# Patient Record
Sex: FEMALE | Race: BLACK OR AFRICAN AMERICAN | NOT HISPANIC OR LATINO | URBAN - METROPOLITAN AREA
[De-identification: names, ages, dates, MRNs, and addresses within clinical notes are randomized per-mention and may not be internally consistent; named-entity substitution may affect disease eponyms.]

---

## 2017-05-02 ENCOUNTER — EMERGENCY (EMERGENCY)
Facility: HOSPITAL | Age: 47
LOS: 1 days | Discharge: HOME | End: 2017-05-02
Attending: EMERGENCY MEDICINE | Admitting: EMERGENCY MEDICINE
Payer: COMMERCIAL

## 2017-05-02 VITALS
SYSTOLIC BLOOD PRESSURE: 185 MMHG | DIASTOLIC BLOOD PRESSURE: 94 MMHG | TEMPERATURE: 99 F | OXYGEN SATURATION: 98 % | HEART RATE: 72 BPM | RESPIRATION RATE: 18 BRPM
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
## 2017-05-02 DIAGNOSIS — Z79.899 OTHER LONG TERM (CURRENT) DRUG THERAPY: ICD-10-CM
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
## 2017-05-02 DIAGNOSIS — M25.561 PAIN IN RIGHT KNEE: ICD-10-CM
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
## 2017-05-02 DIAGNOSIS — S80.01XA CONTUSION OF RIGHT KNEE, INITIAL ENCOUNTER: ICD-10-CM

## 2017-05-02 PROCEDURE — 73590 X-RAY EXAM OF LOWER LEG: CPT | Mod: 26,RT

## 2017-05-02 PROCEDURE — 99284 EMERGENCY DEPT VISIT MOD MDM: CPT

## 2017-05-02 PROCEDURE — 99284 EMERGENCY DEPT VISIT MOD MDM: CPT | Mod: 25

## 2017-05-02 PROCEDURE — 73562 X-RAY EXAM OF KNEE 3: CPT | Mod: 26,RT

## 2017-05-02 PROCEDURE — 73562 X-RAY EXAM OF KNEE 3: CPT

## 2017-05-02 PROCEDURE — 73590 X-RAY EXAM OF LOWER LEG: CPT

## 2017-05-02 RX ORDER — IBUPROFEN 200 MG
600 TABLET ORAL ONCE
Qty: 0 | Refills: 0 | Status: COMPLETED | OUTPATIENT
Start: 2017-05-02 | End: 2017-05-02

## 2017-05-02 RX ORDER — IBUPROFEN 200 MG
1 TABLET ORAL
Qty: 30 | Refills: 0 | OUTPATIENT
Start: 2017-05-02

## 2017-05-02 RX ADMIN — Medication 600 MILLIGRAM(S): at 20:20

## 2017-05-02 NOTE — ED ADULT TRIAGE NOTE - CHIEF COMPLAINT QUOTE
Patient c/o of R arm pain and R knee pain after mechanical  fall 1 hour on a broken sidewalk.  Denies loc/dizziness at time of fall.  Able to ambulate w steady gait. Denies head trauma.

## 2017-05-02 NOTE — ED PROVIDER NOTE - DIAGNOSTIC INTERPRETATION
knee xray: no fracture or dislocation read by Dr. Castanon  tib/fib xray: no fracture or dislocation read by Dr. Castanon

## 2017-05-02 NOTE — ED PROVIDER NOTE - MEDICAL DECISION MAKING DETAILS
mechanical trip and fall.  xray without fracture.  suspect abrasion/contusion.  rec motrin/nsaids/elevation
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
## 2017-05-02 NOTE — ED PROVIDER NOTE - SKIN, MLM
Skin normal color for race, warm, dry and intact except for small knee abrasion. No evidence of rash.

## 2017-05-02 NOTE — ED PROVIDER NOTE - MUSCULOSKELETAL, MLM
Spine appears normal, range of motion is not limited.  abrasion to right anterior knee. no deformity.  tenderness anterior right knee, posterior calf.  normal rom of all joints.  discomfort with palpation of right upper extremity without visible abrasion/contusion/focal bony tenderness

## 2017-05-02 NOTE — ED ADULT NURSE NOTE - OBJECTIVE STATEMENT
Pt c/o R arm and knee pain after mechanical fall today. Pt denies LOC or head injury. No deformities noted. Pt ambulating steadily.

## 2017-07-20 ENCOUNTER — EMERGENCY (EMERGENCY)
Facility: HOSPITAL | Age: 47
LOS: 1 days | Discharge: HOME | End: 2017-07-20
Attending: EMERGENCY MEDICINE | Admitting: EMERGENCY MEDICINE
Payer: COMMERCIAL

## 2017-07-20 VITALS
OXYGEN SATURATION: 98 % | RESPIRATION RATE: 18 BRPM | DIASTOLIC BLOOD PRESSURE: 106 MMHG | TEMPERATURE: 98 F | HEART RATE: 70 BPM | SYSTOLIC BLOOD PRESSURE: 155 MMHG

## 2017-07-20 VITALS
SYSTOLIC BLOOD PRESSURE: 152 MMHG | RESPIRATION RATE: 18 BRPM | TEMPERATURE: 99 F | HEART RATE: 75 BPM | WEIGHT: 266.76 LBS | OXYGEN SATURATION: 96 % | DIASTOLIC BLOOD PRESSURE: 112 MMHG

## 2017-07-20 DIAGNOSIS — R42 DIZZINESS AND GIDDINESS: ICD-10-CM

## 2017-07-20 DIAGNOSIS — Z79.899 OTHER LONG TERM (CURRENT) DRUG THERAPY: ICD-10-CM

## 2017-07-20 DIAGNOSIS — R26.2 DIFFICULTY IN WALKING, NOT ELSEWHERE CLASSIFIED: ICD-10-CM

## 2017-07-20 DIAGNOSIS — I10 ESSENTIAL (PRIMARY) HYPERTENSION: ICD-10-CM

## 2017-07-20 DIAGNOSIS — Z88.8 ALLERGY STATUS TO OTHER DRUGS, MEDICAMENTS AND BIOLOGICAL SUBSTANCES STATUS: ICD-10-CM

## 2017-07-20 LAB
ALBUMIN SERPL ELPH-MCNC: 4.1 G/DL — SIGNIFICANT CHANGE UP (ref 3.3–5)
ALP SERPL-CCNC: 88 U/L — SIGNIFICANT CHANGE UP (ref 40–120)
ALT FLD-CCNC: 17 U/L — SIGNIFICANT CHANGE UP (ref 10–45)
ANION GAP SERPL CALC-SCNC: 11 MMOL/L — SIGNIFICANT CHANGE UP (ref 5–17)
AST SERPL-CCNC: 18 U/L — SIGNIFICANT CHANGE UP (ref 10–40)
BILIRUB SERPL-MCNC: 0.4 MG/DL — SIGNIFICANT CHANGE UP (ref 0.2–1.2)
BUN SERPL-MCNC: 11 MG/DL — SIGNIFICANT CHANGE UP (ref 7–23)
CALCIUM SERPL-MCNC: 9 MG/DL — SIGNIFICANT CHANGE UP (ref 8.4–10.5)
CHLORIDE SERPL-SCNC: 103 MMOL/L — SIGNIFICANT CHANGE UP (ref 96–108)
CK MB CFR SERPL CALC: <1 NG/ML — SIGNIFICANT CHANGE UP (ref 0–6.7)
CK SERPL-CCNC: 83 U/L — SIGNIFICANT CHANGE UP (ref 25–170)
CO2 SERPL-SCNC: 26 MMOL/L — SIGNIFICANT CHANGE UP (ref 22–31)
CREAT SERPL-MCNC: 1 MG/DL — SIGNIFICANT CHANGE UP (ref 0.5–1.3)
GLUCOSE SERPL-MCNC: 101 MG/DL — HIGH (ref 70–99)
HCT VFR BLD CALC: 37.3 % — SIGNIFICANT CHANGE UP (ref 34.5–45)
HGB BLD-MCNC: 13.2 G/DL — SIGNIFICANT CHANGE UP (ref 11.5–15.5)
MAGNESIUM SERPL-MCNC: 2.1 MG/DL — SIGNIFICANT CHANGE UP (ref 1.6–2.6)
MCHC RBC-ENTMCNC: 30.3 PG — SIGNIFICANT CHANGE UP (ref 27–34)
MCHC RBC-ENTMCNC: 35.4 G/DL — SIGNIFICANT CHANGE UP (ref 32–36)
MCV RBC AUTO: 85.7 FL — SIGNIFICANT CHANGE UP (ref 80–100)
PLATELET # BLD AUTO: 236 K/UL — SIGNIFICANT CHANGE UP (ref 150–400)
POTASSIUM SERPL-MCNC: 3.7 MMOL/L — SIGNIFICANT CHANGE UP (ref 3.5–5.3)
POTASSIUM SERPL-SCNC: 3.7 MMOL/L — SIGNIFICANT CHANGE UP (ref 3.5–5.3)
PROT SERPL-MCNC: 7.8 G/DL — SIGNIFICANT CHANGE UP (ref 6–8.3)
RBC # BLD: 4.35 M/UL — SIGNIFICANT CHANGE UP (ref 3.8–5.2)
RBC # FLD: 13 % — SIGNIFICANT CHANGE UP (ref 10.3–16.9)
SODIUM SERPL-SCNC: 140 MMOL/L — SIGNIFICANT CHANGE UP (ref 135–145)
TROPONIN T SERPL-MCNC: <0.01 NG/ML — SIGNIFICANT CHANGE UP (ref 0–0.01)
WBC # BLD: 7.2 K/UL — SIGNIFICANT CHANGE UP (ref 3.8–10.5)
WBC # FLD AUTO: 7.2 K/UL — SIGNIFICANT CHANGE UP (ref 3.8–10.5)

## 2017-07-20 PROCEDURE — 82550 ASSAY OF CK (CPK): CPT

## 2017-07-20 PROCEDURE — 82553 CREATINE MB FRACTION: CPT

## 2017-07-20 PROCEDURE — 84484 ASSAY OF TROPONIN QUANT: CPT

## 2017-07-20 PROCEDURE — 99285 EMERGENCY DEPT VISIT HI MDM: CPT | Mod: 25

## 2017-07-20 PROCEDURE — 93010 ELECTROCARDIOGRAM REPORT: CPT

## 2017-07-20 PROCEDURE — 70450 CT HEAD/BRAIN W/O DYE: CPT

## 2017-07-20 PROCEDURE — 80053 COMPREHEN METABOLIC PANEL: CPT

## 2017-07-20 PROCEDURE — 83735 ASSAY OF MAGNESIUM: CPT

## 2017-07-20 PROCEDURE — 93005 ELECTROCARDIOGRAM TRACING: CPT

## 2017-07-20 PROCEDURE — 70450 CT HEAD/BRAIN W/O DYE: CPT | Mod: 26

## 2017-07-20 PROCEDURE — 36415 COLL VENOUS BLD VENIPUNCTURE: CPT

## 2017-07-20 PROCEDURE — 96375 TX/PRO/DX INJ NEW DRUG ADDON: CPT | Mod: XU

## 2017-07-20 PROCEDURE — 71275 CT ANGIOGRAPHY CHEST: CPT

## 2017-07-20 PROCEDURE — 71275 CT ANGIOGRAPHY CHEST: CPT | Mod: 26

## 2017-07-20 PROCEDURE — 85027 COMPLETE CBC AUTOMATED: CPT

## 2017-07-20 PROCEDURE — 96374 THER/PROPH/DIAG INJ IV PUSH: CPT | Mod: XU

## 2017-07-20 PROCEDURE — 99284 EMERGENCY DEPT VISIT MOD MDM: CPT | Mod: 25

## 2017-07-20 RX ORDER — METOCLOPRAMIDE HCL 10 MG
10 TABLET ORAL ONCE
Qty: 0 | Refills: 0 | Status: COMPLETED | OUTPATIENT
Start: 2017-07-20 | End: 2017-07-20

## 2017-07-20 RX ORDER — MECLIZINE HCL 12.5 MG
25 TABLET ORAL ONCE
Qty: 0 | Refills: 0 | Status: COMPLETED | OUTPATIENT
Start: 2017-07-20 | End: 2017-07-20

## 2017-07-20 RX ADMIN — Medication 10 MILLIGRAM(S): at 19:10

## 2017-07-20 RX ADMIN — Medication 25 MILLIGRAM(S): at 16:13

## 2017-07-20 NOTE — ED PROVIDER NOTE - OBJECTIVE STATEMENT
Patient is a 46 year old female, PMH uncontrolled HTN (medication non-compliance), presenting with feeling like the room is spinning and persistent intermittent chest pain for one week. Patient is a 46 year old female, PMH uncontrolled HTN (medication non-compliance), presenting with feeling like the room is spinning and persistent intermittent chest pain for one week. One week ago she developed a sharp, aching pain the left pectoral area of her chest, no radiation, occurs at rest and exertion. She experienced a similar pain two years ago and had a negative stress test/outpatient workup. Yesterday she went to urgent care for the pain, had an x-ray which showed cardiomegaly, and was told to follow up with her PMD. Currently she is not short of breath, but gradually she has developed dyspnea on exertion over the past few months- can only walk half a block before having to rest.   Patient came in today because she developed a feeling like the room is spinning; started yesterday and worsened today.  She is able to ambulate but feels unsteady. No recent illness, blurry vision, double vision, headache or tinnitus.   Of note her blood pressure is uncontrolled - does not take her HCTZ every day as instructed. Patient is a 46 year old female, PMH uncontrolled HTN (medication non-compliance), presenting with feeling like the room is spinning and persistent intermittent chest pain for one week. One week ago she developed a sharp, aching pain the left pectoral area of her chest, no radiation, occurs at rest and exertion. She experienced a similar pain two years ago and had a negative stress test/outpatient workup. Yesterday she went to urgent care for the pain, had an x-ray which showed cardiomegaly, and was told to follow up with her PMD. Currently she is not short of breath, but gradually she has developed dyspnea on exertion over the past few months- can only walk half a block before having to rest.   Patient came in today because she developed a feeling like the room is spinning; started yesterday and worsened today.  She is able to ambulate but feels unsteady. No recent illness, blurry vision, double vision, headache, tinnitus, numbness/tingling, weakness.  Of note her blood pressure is uncontrolled - does not take her HCTZ every day as instructed.

## 2017-07-20 NOTE — ED PROVIDER NOTE - MEDICAL DECISION MAKING DETAILS
Patient is a 46 year old female, PMH uncontrolled HTN, presenting with persistent intermittent chest pain and new onset sensation of the room spinning. Patient is a 46 year old female, PMH uncontrolled HTN, presenting with persistent intermittent chest pain and new onset sensation of the room spinning. Initally hyptertensive with DBP >100, but resolved to normal on repeat BP. Lab work normal. Performed CTPE in the setting of progressive dyspnea on exertion and intermittent chest pain-negative for acute PE.  Area of tenderness left supraclavicular region, confirmed as prominent left supraclavicular lymph nodes on chest CT, will need outpatient follow up and likely biopsy. Persistent vertigo after one dose of Meclizine, will give Reglan. In regards to chest pain, negative for PE, EKG normal, troponin negative.  Possibly secondary to episodes of hypertension. Encouraged medication compliance and follow up with PMD. Patient is a 46 year old female, PMH uncontrolled HTN, presenting with persistent intermittent chest pain and new onset sensation of the room spinning. Initially hypertensive with DBP >100, but resolved to normal on repeat BP. Lab work normal. Performed CTPE in the setting of progressive dyspnea on exertion and intermittent chest pain-negative for acute PE.  Area of tenderness left supraclavicular region, confirmed as prominent left supraclavicular lymph nodes on chest CT, will need outpatient follow up and likely biopsy. Persistent vertigo with no relief post meclizine and reglan. Will obtain head CT in the setting of persistent vertigo , will also give Valium. In regards to chest pain, negative for PE, EKG normal, troponin negative.  Possibly secondary to episodes of hypertension. Encouraged medication compliance and follow up with PMD. Patient is a 46 year old female, PMH uncontrolled HTN, presenting with persistent intermittent chest pain and new onset sensation of the room spinning. Initially hypertensive with DBP >100, but resolved to normal on repeat BP. Lab work normal. Performed CTPE in the setting of progressive dyspnea on exertion and intermittent chest pain-negative for acute PE.  Area of tenderness left supraclavicular region, confirmed as prominent left supraclavicular lymph nodes on chest CT, will need outpatient follow up and likely biopsy. Persistent vertigo with no relief post meclizine and reglan. Will obtain head CT in the setting of persistent vertigo , will also give Valium. In regards to chest pain, negative for PE, EKG normal, troponin negative.  Possibly secondary to episodes of hypertension. Encouraged medication compliance and follow up with PMD. ( offered admission but prefers d/c and f/u as outpatient and such stressed )

## 2017-07-20 NOTE — ED PROVIDER NOTE - ATTENDING CONTRIBUTION TO CARE
45yo woman with c/o dizziness as well as CP and swelling at her left shoulder. Pt with CP that occurred this week, no h/o CAD no cough, no fever, no recent travel. PT states concerned for blood clot. PT also with uncontrolled HTN and notes dizziness describing vertigo and intermittent  positional sx's. PT has also noted swelling to the left clavicle region with associated pain. Pt afVSS well appearing, breathing easily with good air movement, neuro intact with no findings of ataxia or cerebellar dsyfunction, has + edema with TTP and 1cm mobile ST mass to the left supraclavicular region. PT labs without acute findings, troponin negative, CTA done to r/o PE and not found to have such, noted to have enlarged lymph node to the supraclavicular region and instructed as to importance for follow up and biopsy. Pt remained dizzy despite treatment in the ED and so HCT ordered in conjunction with valium for sx control. Pt singed out pending these tests.

## 2017-07-20 NOTE — ED PROVIDER NOTE - CARDIAC, MLM
Normal rate, regular rhythm.  Heart sounds S1, S2.  No murmurs, rubs or gallops. Left pectoral area-nontender to palpation, no rash

## 2017-07-20 NOTE — ED ADULT NURSE NOTE - OBJECTIVE STATEMENT
received pt in room 15. A&Ox3, hx htn, did not take prescribed bp med today. pt c.o dizziness x few days. cp, left sided, intermittent, no radiation, no sob x 1 week. pt c.o ha and nausea. pt also c.o lump with pain noted to L clavical.  denies abd pain. no fever or chills. no cough. seen at urgent care, xray completed. pt could not see followup md. ekg completed. awaiting md byers.

## 2017-07-20 NOTE — ED ADULT TRIAGE NOTE - CHIEF COMPLAINT QUOTE
c/o dizziness since yesterday, worse when bending and reports having chest pain yesterday. Pt states had CXR at urgent Care yesterday and was informed to f/u with PCP and cardiologist today.

## 2019-01-29 PROBLEM — I10 ESSENTIAL (PRIMARY) HYPERTENSION: Chronic | Status: ACTIVE | Noted: 2017-07-20

## 2019-02-05 ENCOUNTER — RESULT REVIEW (OUTPATIENT)
Age: 49
End: 2019-02-05

## 2019-02-05 ENCOUNTER — OUTPATIENT (OUTPATIENT)
Dept: OUTPATIENT SERVICES | Facility: HOSPITAL | Age: 49
LOS: 1 days | Discharge: HOME | End: 2019-02-05
Payer: COMMERCIAL

## 2019-02-05 VITALS
TEMPERATURE: 97 F | HEART RATE: 79 BPM | WEIGHT: 263.23 LBS | SYSTOLIC BLOOD PRESSURE: 144 MMHG | OXYGEN SATURATION: 100 % | HEIGHT: 66 IN | RESPIRATION RATE: 16 BRPM | DIASTOLIC BLOOD PRESSURE: 90 MMHG

## 2019-02-05 VITALS
DIASTOLIC BLOOD PRESSURE: 66 MMHG | RESPIRATION RATE: 18 BRPM | SYSTOLIC BLOOD PRESSURE: 143 MMHG | HEART RATE: 77 BPM | OXYGEN SATURATION: 97 %

## 2019-02-05 DIAGNOSIS — Z98.51 TUBAL LIGATION STATUS: Chronic | ICD-10-CM

## 2019-02-05 DIAGNOSIS — Z98.891 HISTORY OF UTERINE SCAR FROM PREVIOUS SURGERY: Chronic | ICD-10-CM

## 2019-02-05 PROCEDURE — 58558 HYSTEROSCOPY BIOPSY: CPT

## 2019-02-05 PROCEDURE — 86850 RBC ANTIBODY SCREEN: CPT

## 2019-02-05 PROCEDURE — 88305 TISSUE EXAM BY PATHOLOGIST: CPT

## 2019-02-05 PROCEDURE — 86901 BLOOD TYPING SEROLOGIC RH(D): CPT

## 2019-02-05 PROCEDURE — 86900 BLOOD TYPING SEROLOGIC ABO: CPT

## 2019-02-05 RX ORDER — HYDROMORPHONE HYDROCHLORIDE 2 MG/ML
0.5 INJECTION INTRAMUSCULAR; INTRAVENOUS; SUBCUTANEOUS
Qty: 0 | Refills: 0 | Status: DISCONTINUED | OUTPATIENT
Start: 2019-02-05 | End: 2019-02-05

## 2019-02-05 RX ORDER — METOCLOPRAMIDE HCL 10 MG
10 TABLET ORAL EVERY 6 HOURS
Qty: 0 | Refills: 0 | Status: DISCONTINUED | OUTPATIENT
Start: 2019-02-05 | End: 2019-02-05

## 2019-02-05 RX ORDER — ONDANSETRON 8 MG/1
4 TABLET, FILM COATED ORAL ONCE
Qty: 0 | Refills: 0 | Status: COMPLETED | OUTPATIENT
Start: 2019-02-05 | End: 2019-02-05

## 2019-02-05 RX ORDER — SODIUM CHLORIDE 9 MG/ML
1000 INJECTION, SOLUTION INTRAVENOUS
Qty: 0 | Refills: 0 | Status: DISCONTINUED | OUTPATIENT
Start: 2019-02-05 | End: 2019-02-05

## 2019-02-05 RX ORDER — ACETAMINOPHEN 500 MG
1000 TABLET ORAL ONCE
Qty: 0 | Refills: 0 | Status: COMPLETED | OUTPATIENT
Start: 2019-02-05 | End: 2019-02-05

## 2019-02-05 RX ORDER — METOPROLOL TARTRATE 50 MG
3 TABLET ORAL ONCE
Qty: 0 | Refills: 0 | Status: COMPLETED | OUTPATIENT
Start: 2019-02-05 | End: 2019-02-05

## 2019-02-05 RX ADMIN — Medication 106 MILLIGRAM(S): at 17:30

## 2019-02-05 RX ADMIN — Medication 1000 MILLIGRAM(S): at 19:13

## 2019-02-05 RX ADMIN — Medication 1000 MILLIGRAM(S): at 18:45

## 2019-02-05 RX ADMIN — HYDROMORPHONE HYDROCHLORIDE 0.5 MILLIGRAM(S): 2 INJECTION INTRAMUSCULAR; INTRAVENOUS; SUBCUTANEOUS at 17:00

## 2019-02-05 RX ADMIN — ONDANSETRON 4 MILLIGRAM(S): 8 TABLET, FILM COATED ORAL at 17:06

## 2019-02-05 RX ADMIN — HYDROMORPHONE HYDROCHLORIDE 0.5 MILLIGRAM(S): 2 INJECTION INTRAMUSCULAR; INTRAVENOUS; SUBCUTANEOUS at 17:30

## 2019-02-05 NOTE — BRIEF OPERATIVE NOTE - POST-OP DX
Cervical polyp  02/05/2019    Active  Trell Faustin  Endometrial polyp  02/05/2019    Active  Trell Faustin

## 2019-02-05 NOTE — BRIEF OPERATIVE NOTE - PROCEDURE
<<-----Click on this checkbox to enter Procedure Hysteroscopic polypectomy of uterus with dilation and curettage  02/05/2019    Active  [email or web address]

## 2019-02-06 LAB — SURGICAL PATHOLOGY STUDY: SIGNIFICANT CHANGE UP

## 2019-03-18 NOTE — ED ADULT NURSE NOTE - PAIN: BODY LOCATION
Detail Level: Zone
Plan: Refrain from using tretinoin away from mouth.
Otc Regimen: Clindamycin, doxycycline, tretinoin
Otc Regimen: Zyrtec 10 mg, hibiclens
R arm

## 2019-04-03 ENCOUNTER — APPOINTMENT (OUTPATIENT)
Dept: PULMONOLOGY | Facility: CLINIC | Age: 49
End: 2019-04-03
Payer: COMMERCIAL

## 2019-04-03 VITALS
HEIGHT: 66 IN | OXYGEN SATURATION: 98 % | HEART RATE: 62 BPM | SYSTOLIC BLOOD PRESSURE: 120 MMHG | WEIGHT: 271 LBS | DIASTOLIC BLOOD PRESSURE: 90 MMHG | BODY MASS INDEX: 43.55 KG/M2 | RESPIRATION RATE: 12 BRPM | TEMPERATURE: 98.2 F

## 2019-04-03 DIAGNOSIS — I10 ESSENTIAL (PRIMARY) HYPERTENSION: ICD-10-CM

## 2019-04-03 DIAGNOSIS — E66.01 MORBID (SEVERE) OBESITY DUE TO EXCESS CALORIES: ICD-10-CM

## 2019-04-03 DIAGNOSIS — F41.9 ANXIETY DISORDER, UNSPECIFIED: ICD-10-CM

## 2019-04-03 PROCEDURE — 99204 OFFICE O/P NEW MOD 45 MIN: CPT

## 2019-04-03 NOTE — END OF VISIT
[>50% of Time Spent on Counseling for ____] : Greater than 50% of the encounter time was spent on counseling for [unfilled] [FreeTextEntry3] : Case reviewed and patient examined with PA, plans as noted.\par Based on history and physical exam, sleep disordered breathing is at least moderately likely.  The patient was advised to have overnight polysomnography, and will be seen in follow up after testing. Also has suboptimal sleep habits, discussed sleep restriction therapy.  Suspect biggest problem is obstructive sleep apnea \par \par The patient is advised that in addition to worsening sleep leading to daytime sleepiness, sleep apnea may be associated with worsening hypertension and may be a risk factor for cardiovascular disease and stroke.

## 2019-04-03 NOTE — REVIEW OF SYSTEMS
[EDS: ESS=____] : daytime somnolence: ESS=[unfilled] [Recent Wt Gain (___ Lbs)] : recent [unfilled] ~Ulb weight gain [A.M. Headache] : headache present upon awakening [Nocturia] : nocturia [Negative] : Psychiatric [Unusual Sleep Behavior] : no unusual sleep behavior

## 2019-04-03 NOTE — PHYSICAL EXAM
[General Appearance - Well Developed] : well developed [Normal Conjunctiva] : the conjunctiva exhibited no abnormalities [Eyelids - No Xanthelasma] : the eyelids demonstrated no xanthelasmas [IV] : IV [Neck Appearance] : the appearance of the neck was normal [Neck Cervical Mass (___cm)] : no neck mass was observed [Jugular Venous Distention Increased] : there was no jugular-venous distention [Thyroid Diffuse Enlargement] : the thyroid was not enlarged [Thyroid Nodule] : there were no palpable thyroid nodules [Heart Rate And Rhythm] : heart rate was normal and rhythm regular [Heart Sounds] : normal S1 and S2 [Heart Sounds Gallop] : no gallops [Murmurs] : no murmurs [Heart Sounds Pericardial Friction Rub] : no pericardial rub [Auscultation Breath Sounds / Voice Sounds] : lungs were clear to auscultation bilaterally [Skin Color & Pigmentation] : normal skin color and pigmentation [Skin Turgor] : normal skin turgor [Deep Tendon Reflexes (DTR)] : deep tendon reflexes were 2+ and symmetric [Sensation] : the sensory exam was normal to light touch and pinprick [No Focal Deficits] : no focal deficits [Oriented To Time, Place, And Person] : oriented to person, place, and time [Impaired Insight] : insight and judgment were intact [Affect] : the affect was normal [FreeTextEntry1] : morbidly obese  [Nail Clubbing] : no clubbing of the fingernails [Cyanosis, Localized] : no localized cyanosis [Petechial Hemorrhages (___cm)] : no petechial hemorrhages [] : no ischemic changes

## 2019-04-03 NOTE — HISTORY OF PRESENT ILLNESS
[FreeTextEntry1] : 49 y/o AA F with PMHx HTN, obesity BMI 44, anxiety, depression, CALI who is refereed by Dr. Rodriguez for initial evaluation of sleep apnea.\par She works for [employer]  and her hours of work are 8-4. She is complaining of snoring for ~ 7 years and maintaining sleep. She wakes up with occasional morning HA  and has gained about 6lb for the past few months. She also has had sleep paralysis about 2 years ago but has not had any experienced lately. \par \par Sleep routine:\par She goes to bed at 9, sleep latency is about one hour, she wakes up 3 times/night, WASO is about 30 min and then she is up at 6AM. She does not take a nap. She smokes Marijuana and is helping her sleep, her last use was in February. Her ESS is 12/24.\par \par She denies cataplexy, RLS, parasomnia, or any other sleep behavioral issues.\par \par

## 2019-04-03 NOTE — ASSESSMENT
[FreeTextEntry1] : 47 y/o F with chronic CALI who is refereed by Dr. Rodriguez for initial evaluation \par \par

## 2019-04-03 NOTE — REASON FOR VISIT
[Initial Evaluation] : an initial evaluation [Sleep Apnea] : sleep apnea [FreeTextEntry2] : referred by Dr. Rodriguez

## 2019-04-11 ENCOUNTER — OUTPATIENT (OUTPATIENT)
Dept: OUTPATIENT SERVICES | Facility: HOSPITAL | Age: 49
LOS: 1 days | End: 2019-04-11
Payer: COMMERCIAL

## 2019-04-11 ENCOUNTER — APPOINTMENT (OUTPATIENT)
Dept: SLEEP CENTER | Facility: HOSPITAL | Age: 49
End: 2019-04-11

## 2019-04-11 DIAGNOSIS — Z98.891 HISTORY OF UTERINE SCAR FROM PREVIOUS SURGERY: Chronic | ICD-10-CM

## 2019-04-11 DIAGNOSIS — G47.33 OBSTRUCTIVE SLEEP APNEA (ADULT) (PEDIATRIC): ICD-10-CM

## 2019-04-11 DIAGNOSIS — Z98.51 TUBAL LIGATION STATUS: Chronic | ICD-10-CM

## 2019-04-11 PROCEDURE — 95810 POLYSOM 6/> YRS 4/> PARAM: CPT | Mod: 26

## 2019-04-11 PROCEDURE — 95810 POLYSOM 6/> YRS 4/> PARAM: CPT

## 2019-05-06 ENCOUNTER — APPOINTMENT (OUTPATIENT)
Dept: PULMONOLOGY | Facility: CLINIC | Age: 49
End: 2019-05-06
Payer: COMMERCIAL

## 2019-05-06 ENCOUNTER — APPOINTMENT (OUTPATIENT)
Dept: PULMONOLOGY | Facility: CLINIC | Age: 49
End: 2019-05-06

## 2019-05-06 VITALS
BODY MASS INDEX: 43.55 KG/M2 | WEIGHT: 271 LBS | HEART RATE: 76 BPM | SYSTOLIC BLOOD PRESSURE: 130 MMHG | HEIGHT: 66 IN | OXYGEN SATURATION: 98 % | TEMPERATURE: 98.9 F | DIASTOLIC BLOOD PRESSURE: 90 MMHG

## 2019-05-06 DIAGNOSIS — G47.30 SLEEP APNEA, UNSPECIFIED: ICD-10-CM

## 2019-05-06 PROCEDURE — 99213 OFFICE O/P EST LOW 20 MIN: CPT

## 2019-05-07 PROBLEM — G47.30 SLEEP APNEA: Status: ACTIVE | Noted: 2019-04-03

## 2019-05-07 NOTE — HISTORY OF PRESENT ILLNESS
[FreeTextEntry1] : 49 y/o F who is here for follow up after her PSG in 4/11/19 which showed AHI using the AASM criteria at 6.6. The AHI using CMS criteria was at 2.3 with minimal oxygen saturation of 89%.\par She is doing well since the last visit and has no new c/o

## 2019-05-07 NOTE — END OF VISIT
[FreeTextEntry3] : Insignificant obstructive sleep apnea on overnight polysomnography; reassured, discussed sleep hygiene

## 2019-05-07 NOTE — PHYSICAL EXAM
[General Appearance - Well Developed] : well developed [Normal Appearance] : normal appearance [Well Groomed] : well groomed [General Appearance - Well Nourished] : well nourished [No Deformities] : no deformities [General Appearance - In No Acute Distress] : no acute distress [Normal Conjunctiva] : the conjunctiva exhibited no abnormalities [Eyelids - No Xanthelasma] : the eyelids demonstrated no xanthelasmas [IV] : IV [Heart Rate And Rhythm] : heart rate and rhythm were normal [Heart Sounds] : normal S1 and S2 [Murmurs] : no murmurs present [Respiration, Rhythm And Depth] : normal respiratory rhythm and effort [Exaggerated Use Of Accessory Muscles For Inspiration] : no accessory muscle use [Auscultation Breath Sounds / Voice Sounds] : lungs were clear to auscultation bilaterally [Abdomen Soft] : soft [Abdomen Tenderness] : non-tender [Abdomen Mass (___ Cm)] : no abdominal mass palpated [Abnormal Walk] : normal gait [Nail Clubbing] : no clubbing of the fingernails [Cyanosis, Localized] : no localized cyanosis [Gait - Sufficient For Exercise Testing] : the gait was sufficient for exercise testing [Skin Color & Pigmentation] : normal skin color and pigmentation [Petechial Hemorrhages (___cm)] : no petechial hemorrhages [] : no rash [Skin Lesions] : no skin lesions [No Venous Stasis] : no venous stasis [Deep Tendon Reflexes (DTR)] : deep tendon reflexes were 2+ and symmetric [No Xanthoma] : no  xanthoma was observed [No Skin Ulcers] : no skin ulcer [Oriented To Time, Place, And Person] : oriented to person, place, and time [Sensation] : the sensory exam was normal to light touch and pinprick [No Focal Deficits] : no focal deficits [Affect] : the affect was normal [Impaired Insight] : insight and judgment were intact [FreeTextEntry1] : large neck

## 2019-05-08 ENCOUNTER — OTHER (OUTPATIENT)
Age: 49
End: 2019-05-08

## 2019-06-01 ENCOUNTER — EMERGENCY (EMERGENCY)
Facility: HOSPITAL | Age: 49
LOS: 1 days | Discharge: HOME | End: 2019-06-01
Attending: EMERGENCY MEDICINE | Admitting: EMERGENCY MEDICINE
Payer: SELF-PAY

## 2019-06-01 VITALS
DIASTOLIC BLOOD PRESSURE: 72 MMHG | RESPIRATION RATE: 18 BRPM | OXYGEN SATURATION: 100 % | SYSTOLIC BLOOD PRESSURE: 118 MMHG | HEART RATE: 73 BPM | HEIGHT: 65 IN | WEIGHT: 270.07 LBS | TEMPERATURE: 98 F

## 2019-06-01 DIAGNOSIS — M54.5 LOW BACK PAIN: ICD-10-CM
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
## 2019-06-01 DIAGNOSIS — Z98.51 TUBAL LIGATION STATUS: Chronic | ICD-10-CM
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
## 2019-06-01 DIAGNOSIS — Z98.891 HISTORY OF UTERINE SCAR FROM PREVIOUS SURGERY: Chronic | ICD-10-CM

## 2019-06-01 DIAGNOSIS — S39.012A STRAIN OF MUSCLE, FASCIA AND TENDON OF LOWER BACK, INITIAL ENCOUNTER: ICD-10-CM
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
## 2019-06-01 PROCEDURE — 96372 THER/PROPH/DIAG INJ SC/IM: CPT

## 2019-06-01 PROCEDURE — 99283 EMERGENCY DEPT VISIT LOW MDM: CPT | Mod: 25

## 2019-06-01 PROCEDURE — 99283 EMERGENCY DEPT VISIT LOW MDM: CPT

## 2019-06-01 RX ORDER — IBUPROFEN 200 MG
1 TABLET ORAL
Qty: 20 | Refills: 0
Start: 2019-06-01

## 2019-06-01 RX ORDER — KETOROLAC TROMETHAMINE 30 MG/ML
30 SYRINGE (ML) INJECTION ONCE
Refills: 0 | Status: DISCONTINUED | OUTPATIENT
Start: 2019-06-01 | End: 2019-06-01

## 2019-06-01 RX ADMIN — Medication 30 MILLIGRAM(S): at 09:40

## 2019-06-01 RX ADMIN — Medication 30 MILLIGRAM(S): at 09:55

## 2019-06-01 NOTE — ED PROVIDER NOTE - NSFOLLOWUPINSTRUCTIONS_ED_ALL_ED_FT
Take ibuprofen as directed for pain.      Follow up with your PMD next week.    Return to ED with worsening symptoms or other concerns.    Low Back Strain  Image   A strain is a stretch or tear in a muscle or the strong cords of tissue that attach muscle to bone (tendons). Strains of the lower back (lumbar spine) are a common cause of low back pain. A strain occurs when muscles or tendons are torn or are stretched beyond their limits. The muscles may become inflamed, resulting in pain and sudden muscle tightening (spasms). A strain can happen suddenly due to an injury (trauma), or it can develop gradually due to overuse.    There are three types of strains:  Grade 1 is a mild strain involving a minor tear of the muscle fibers or tendons. This may cause some pain but no loss of muscle strength.  Grade 2 is a moderate strain involving a partial tear of the muscle fibers or tendons. This causes more severe pain and some loss of muscle strength.  Grade 3 is a severe strain involving a complete tear of the muscle or tendon. This causes severe pain and complete or nearly complete loss of muscle strength.  What are the causes?  This condition may be caused by:  Trauma, such as a fall or a hit to the body.  Twisting or overstretching the back. This may result from doing activities that require a lot of energy, such as lifting heavy objects.  What increases the risk?  The following factors may increase your risk of getting this condition:  Playing contact sports.  Participating in sports or activities that put excessive stress on the back and require a lot of bending and twisting, including:  Lifting weights or heavy objects.  Gymnastics.  Soccer.  Figure skating.  Snowboarding.  Being overweight or obese.  Having poor strength and flexibility.  What are the signs or symptoms?  Symptoms of this condition may include:  Sharp or dull pain in the lower back that does not go away. Pain may extend to the buttocks.  Stiffness.  Limited range of motion.  Inability to stand up straight due to stiffness or pain.  Muscle spasms.  How is this diagnosed?  This condition may be diagnosed based on:  Your symptoms.  Your medical history.  A physical exam.  Your health care provider may push on certain areas of your back to determine the source of your pain.  You may be asked to bend forward, backward, and side to side to assess the severity of your pain and your range of motion.  Imaging tests, such as:  X-rays.  MRI.  How is this treated?  Treatment for this condition may include:  Applying heat and cold to the affected area.  Medicines to help relieve pain and to relax your muscles (muscle relaxants).  NSAIDs to help reduce swelling and discomfort.  Physical therapy.  When your symptoms improve, it is important to gradually return to your normal routine as soon as possible to reduce pain, avoid stiffness, and avoid loss of muscle strength. Generally, symptoms should improve within 6 weeks of treatment. However, recovery time varies.    Follow these instructions at home:  Managing pain, stiffness, and swelling     If directed, apply ice to the injured area during the first 24 hours after your injury.  Put ice in a plastic bag.  Place a towel between your skin and the bag.  Leave the ice on for 20 minutes, 2–3 times a day.  If directed, apply heat to the affected area as often as told by your health care provider. Use the heat source that your health care provider recommends, such as a moist heat pack or a heating pad.  Place a towel between your skin and the heat source.  Leave the heat on for 20–30 minutes.  Remove the heat if your skin turns bright red. This is especially important if you are unable to feel pain, heat, or cold. You may have a greater risk of getting burned.  Activity     Rest and return to your normal activities as told by your health care provider. Ask your health care provider what activities are safe for you.  Avoid activities that take a lot of effort (are strenuous) for as long as told by your health care provider.  Do exercises as told by your health care provider.  General instructions     Image   Take over-the-counter and prescription medicines only as told by your health care provider.  If you have questions or concerns about safety while taking pain medicine, talk with your health care provider.  Do not drive or operate heavy machinery until you know how your pain medicine affects you.  Do not use any tobacco products, such as cigarettes, chewing tobacco, and e-cigarettes. Tobacco can delay bone healing. If you need help quitting, ask your health care provider.  Keep all follow-up visits as told by your health care provider. This is important.  How is this prevented?  Image Image Image Image Image   Warm up and stretch before being active.  Cool down and stretch after being active.  Give your body time to rest between periods of activity.  Avoid:  Being physically inactive for long periods at a time.  Exercising or playing sports when you are tired or in pain.  Use correct form when playing sports and lifting heavy objects.  Use good posture when sitting and standing.  Maintain a healthy weight.  Sleep on a mattress with medium firmness to support your back.  Make sure to use equipment that fits you, including shoes that fit well.  Be safe and responsible while being active to avoid falls.  Do at least 150 minutes of moderate-intensity exercise each week, such as brisk walking or water aerobics. Try a form of exercise that takes stress off your back, such as swimming or stationary cycling.  Maintain physical fitness, including:  Strength.  Flexibility.  Cardiovascular fitness.  Endurance.  Contact a health care provider if:  Your back pain does not improve after 6 weeks of treatment.  Your symptoms get worse.  Get help right away if:  Your back pain is severe.  You are unable to stand or walk.  You develop pain in your legs.  You develop weakness in your buttocks or legs.  You have difficulty controlling when you urinate or when you have a bowel movement.  This information is not intended to replace advice given to you by your health care provider. Make sure you discuss any questions you have with your health care provider.    Document Released: 12/18/2006 Document Revised: 02/12/2018 Document Reviewed: 09/28/2016  [employer] Interactive Patient Education © 2019 [employer]
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
## 2019-06-01 NOTE — ED ADULT NURSE NOTE - NSIMPLEMENTINTERV_GEN_ALL_ED
Implemented All Universal Safety Interventions:  [place] to call system. Call bell, personal items and telephone within reach. Instruct patient to call for assistance. Room bathroom lighting operational. Non-slip footwear when patient is off stretcher. Physically safe environment: no spills, clutter or unnecessary equipment. Stretcher in lowest position, wheels locked, appropriate side rails in place.

## 2019-11-22 VITALS
WEIGHT: 274.92 LBS | RESPIRATION RATE: 18 BRPM | HEART RATE: 75 BPM | HEIGHT: 65 IN | DIASTOLIC BLOOD PRESSURE: 85 MMHG | TEMPERATURE: 97 F | OXYGEN SATURATION: 95 % | SYSTOLIC BLOOD PRESSURE: 133 MMHG

## 2019-11-22 NOTE — ASU PATIENT PROFILE, ADULT - PSH
History of   x 2  History of tubal ligation History of   x 2  History of shoulder surgery  Right Rotator cuff repair  History of tubal ligation

## 2019-11-22 NOTE — ASU PATIENT PROFILE, ADULT - NS PRO AD PATIENT TYPE
- Leeroy Banuelos/Health Care Proxy (HCP) Do Not Resuscitate (DNR)/ - Leeroy Banuelos /Health Care Proxy (HCP)

## 2019-11-25 ENCOUNTER — RESULT REVIEW (OUTPATIENT)
Age: 49
End: 2019-11-25

## 2019-11-25 ENCOUNTER — OUTPATIENT (OUTPATIENT)
Dept: OUTPATIENT SERVICES | Facility: HOSPITAL | Age: 49
LOS: 1 days | Discharge: HOME | End: 2019-11-25
Payer: COMMERCIAL

## 2019-11-25 VITALS
RESPIRATION RATE: 20 BRPM | SYSTOLIC BLOOD PRESSURE: 140 MMHG | HEART RATE: 80 BPM | OXYGEN SATURATION: 96 % | DIASTOLIC BLOOD PRESSURE: 89 MMHG

## 2019-11-25 DIAGNOSIS — Z98.891 HISTORY OF UTERINE SCAR FROM PREVIOUS SURGERY: Chronic | ICD-10-CM

## 2019-11-25 DIAGNOSIS — Z98.51 TUBAL LIGATION STATUS: Chronic | ICD-10-CM

## 2019-11-25 DIAGNOSIS — Z98.890 OTHER SPECIFIED POSTPROCEDURAL STATES: Chronic | ICD-10-CM

## 2019-11-25 PROCEDURE — 88300 SURGICAL PATH GROSS: CPT | Mod: 26

## 2019-11-25 PROCEDURE — 76000 FLUOROSCOPY <1 HR PHYS/QHP: CPT

## 2019-11-25 PROCEDURE — C1769: CPT

## 2019-11-25 PROCEDURE — 28285 REPAIR OF HAMMERTOE: CPT | Mod: T9

## 2019-11-25 PROCEDURE — 88300 SURGICAL PATH GROSS: CPT

## 2019-12-02 LAB — SURGICAL PATHOLOGY STUDY: SIGNIFICANT CHANGE UP

## 2020-03-09 NOTE — ASU PREOP CHECKLIST - SELECT TESTS ORDERED
PT/PTT/CBC/INR/CMP
IMPROVE VTE Individual Risk Assessment   RISK                                                          Points  [  ] Previous VTE                                                3  [  ] Thrombophilia                                             2  [  ] Lower limb paralysis                                    2        (unable to hold up >15 seconds)    [  ] Current Cancer                                             2         (within 6 months)  [  x] Immobilization > 24 hrs                              1  [  ] ICU/CCU stay > 24 hours                            1  [x  ] Age > 60                                                    1  IMPROVE VTE Score _________2  Heparin s/c for DVT prophylaxis

## 2021-01-01 NOTE — ASU PATIENT PROFILE, ADULT - BRADEN SCORE (IF 18 OR LESS ACTIVATE SKIN INJURY RISK INCREASED GUIDELINE), MLM
[facility] / [facility], [place]    OPERATIVE NOTE         Patient Name: Barby Guadalupe (Baby Girl, Emy)   Medical Record Number: [id number]  Date of Operation: 2021   Diagnoses: Congenital Heart block (ICD-9 746.86), severe prematurity, 26 weeks.     Procedure: Insert ventricular lead and VVI generator, by subxiphoid approach (CPT 09063, 27488)  Surgeon: Dr. Lobo Goff  Assistants: Dr. Misha Coello  Anesthesia: General endotracheal by Dr. Peres    EBL: less than 2cc    DESCRIPTION OF PROCEDURE:     The patient was positioned on the operating table in the supine position and after an adequate level of general endotracheal anesthesia had been obtained, Dr. Fong placed a femoral Broviac line (dictated separately).  Then the chest and abdomen were prepped and draped in sterile fashion.  Prophylactic antibiotics had been administered just before the Broviac procedure.  After completion of the appropriate Time-Out procedure, a midline incision was made in the subxiphoid region and a plane created under the sternum.  The pericardium was opened exposing the free mall and acute margin of the heart. A bipolar Medtronic 25cm lead (S# [id number]) with steroid eluding tips was sewn onto the surface of the right ventricle, one on the diaphragmatic surface and the other on the anterior wall.  The lead was tested and found to have good sensing capabilities (R wave >21) and pacing threshold (0.6mV). The midline incision was extended inferiorly and the peritoneum opened.  There was sufficient space for the generator between the diaphragm and the left lobe of the liver.  I could nt expose the posterior diaphragm enough to make a [place]-abdiaziz pocket, so we elected to secure the device anteriorly to the diaphragm. The generator was a [employer]. Gamal Microny II SR+ ([id number]). The lead was connected and the pacer placed in the abdomen and then secured on the leftward side with a 5-0 prolene suture  through the provided hole in the generator.  A small piece of the [place]-abdiaziz membrane was used to loop around the lead as it exited the generator and secure that to the diapraghm anteriorly to prevent the device from falling posteriorly behind the liver.  The generator was positioned so that the lead exited anteriorly and to the right.  The excess length of pacing lead was placed partly in the pericardium and partly over the anterior surface of the liver. The pericardium and peritoneum were left open, and no drain was left in place to reduce the risk of infection of hte pacing system.  The midline fascia up to the xiphoid was closed with a running 3-0 Vicryl suture.  The subcutaneous tissue was closed with a running 4-0 Vicryl, and her very thin skin was closed with a running 7-0 prolene suture.  The wound was dressed and the patient taken back to the NICU in satisfactory condition having tolerated the procedure and the anesthesia well.  I was present and performed the entire procedure, and was assisted by Dr. Flood as no qualified resident was available to participate in the case. All sponge instrument and needle counts were correct at the completion of the procedure..           FINAL DIAGNOSIS:  Congenital Heart Block, Severe prematurity         23

## 2021-01-09 ENCOUNTER — EMERGENCY (EMERGENCY)
Facility: HOSPITAL | Age: 51
LOS: 1 days | Discharge: HOME | End: 2021-01-09
Attending: EMERGENCY MEDICINE | Admitting: EMERGENCY MEDICINE
Payer: COMMERCIAL

## 2021-01-09 VITALS
OXYGEN SATURATION: 99 % | DIASTOLIC BLOOD PRESSURE: 90 MMHG | SYSTOLIC BLOOD PRESSURE: 152 MMHG | HEART RATE: 79 BPM | WEIGHT: 274.92 LBS | TEMPERATURE: 98 F | RESPIRATION RATE: 18 BRPM | HEIGHT: 65 IN

## 2021-01-09 VITALS
TEMPERATURE: 98 F | DIASTOLIC BLOOD PRESSURE: 86 MMHG | SYSTOLIC BLOOD PRESSURE: 146 MMHG | RESPIRATION RATE: 18 BRPM | HEART RATE: 68 BPM | OXYGEN SATURATION: 98 %

## 2021-01-09 DIAGNOSIS — R07.89 OTHER CHEST PAIN: ICD-10-CM

## 2021-01-09 DIAGNOSIS — Z98.891 HISTORY OF UTERINE SCAR FROM PREVIOUS SURGERY: Chronic | ICD-10-CM

## 2021-01-09 DIAGNOSIS — Z98.890 OTHER SPECIFIED POSTPROCEDURAL STATES: Chronic | ICD-10-CM

## 2021-01-09 DIAGNOSIS — Z98.51 TUBAL LIGATION STATUS: Chronic | ICD-10-CM

## 2021-01-09 DIAGNOSIS — Z20.822 CONTACT WITH AND (SUSPECTED) EXPOSURE TO COVID-19: ICD-10-CM

## 2021-01-09 DIAGNOSIS — R06.02 SHORTNESS OF BREATH: ICD-10-CM

## 2021-01-09 PROBLEM — F32.9 MAJOR DEPRESSIVE DISORDER, SINGLE EPISODE, UNSPECIFIED: Chronic | Status: ACTIVE | Noted: 2019-11-22

## 2021-01-09 LAB
ALBUMIN SERPL ELPH-MCNC: 4.3 G/DL — SIGNIFICANT CHANGE UP (ref 3.3–5)
ALP SERPL-CCNC: 129 U/L — HIGH (ref 40–120)
ALT FLD-CCNC: 23 U/L — SIGNIFICANT CHANGE UP (ref 10–45)
ANION GAP SERPL CALC-SCNC: 14 MMOL/L — SIGNIFICANT CHANGE UP (ref 5–17)
APTT BLD: 33.8 SEC — SIGNIFICANT CHANGE UP (ref 27.5–35.5)
AST SERPL-CCNC: 23 U/L — SIGNIFICANT CHANGE UP (ref 10–40)
BASOPHILS # BLD AUTO: 0.02 K/UL — SIGNIFICANT CHANGE UP (ref 0–0.2)
BASOPHILS NFR BLD AUTO: 0.3 % — SIGNIFICANT CHANGE UP (ref 0–2)
BILIRUB SERPL-MCNC: 0.4 MG/DL — SIGNIFICANT CHANGE UP (ref 0.2–1.2)
BUN SERPL-MCNC: 18 MG/DL — SIGNIFICANT CHANGE UP (ref 7–23)
CALCIUM SERPL-MCNC: 9 MG/DL — SIGNIFICANT CHANGE UP (ref 8.4–10.5)
CHLORIDE SERPL-SCNC: 104 MMOL/L — SIGNIFICANT CHANGE UP (ref 96–108)
CO2 SERPL-SCNC: 22 MMOL/L — SIGNIFICANT CHANGE UP (ref 22–31)
CREAT SERPL-MCNC: 1.02 MG/DL — SIGNIFICANT CHANGE UP (ref 0.5–1.3)
EOSINOPHIL # BLD AUTO: 0.08 K/UL — SIGNIFICANT CHANGE UP (ref 0–0.5)
EOSINOPHIL NFR BLD AUTO: 1 % — SIGNIFICANT CHANGE UP (ref 0–6)
GLUCOSE SERPL-MCNC: 104 MG/DL — HIGH (ref 70–99)
HCT VFR BLD CALC: 40.8 % — SIGNIFICANT CHANGE UP (ref 34.5–45)
HGB BLD-MCNC: 13.6 G/DL — SIGNIFICANT CHANGE UP (ref 11.5–15.5)
IMM GRANULOCYTES NFR BLD AUTO: 0.3 % — SIGNIFICANT CHANGE UP (ref 0–1.5)
INR BLD: 0.94 — SIGNIFICANT CHANGE UP (ref 0.88–1.16)
LYMPHOCYTES # BLD AUTO: 1.72 K/UL — SIGNIFICANT CHANGE UP (ref 1–3.3)
LYMPHOCYTES # BLD AUTO: 22.1 % — SIGNIFICANT CHANGE UP (ref 13–44)
MCHC RBC-ENTMCNC: 29.3 PG — SIGNIFICANT CHANGE UP (ref 27–34)
MCHC RBC-ENTMCNC: 33.3 GM/DL — SIGNIFICANT CHANGE UP (ref 32–36)
MCV RBC AUTO: 87.9 FL — SIGNIFICANT CHANGE UP (ref 80–100)
MONOCYTES # BLD AUTO: 0.52 K/UL — SIGNIFICANT CHANGE UP (ref 0–0.9)
MONOCYTES NFR BLD AUTO: 6.7 % — SIGNIFICANT CHANGE UP (ref 2–14)
NEUTROPHILS # BLD AUTO: 5.42 K/UL — SIGNIFICANT CHANGE UP (ref 1.8–7.4)
NEUTROPHILS NFR BLD AUTO: 69.6 % — SIGNIFICANT CHANGE UP (ref 43–77)
NRBC # BLD: 0 /100 WBCS — SIGNIFICANT CHANGE UP (ref 0–0)
PLATELET # BLD AUTO: 259 K/UL — SIGNIFICANT CHANGE UP (ref 150–400)
POTASSIUM SERPL-MCNC: 4.1 MMOL/L — SIGNIFICANT CHANGE UP (ref 3.5–5.3)
POTASSIUM SERPL-SCNC: 4.1 MMOL/L — SIGNIFICANT CHANGE UP (ref 3.5–5.3)
PROT SERPL-MCNC: 8.1 G/DL — SIGNIFICANT CHANGE UP (ref 6–8.3)
PROTHROM AB SERPL-ACNC: 11.3 SEC — SIGNIFICANT CHANGE UP (ref 10.6–13.6)
RBC # BLD: 4.64 M/UL — SIGNIFICANT CHANGE UP (ref 3.8–5.2)
RBC # FLD: 13 % — SIGNIFICANT CHANGE UP (ref 10.3–14.5)
SARS-COV-2 RNA SPEC QL NAA+PROBE: SIGNIFICANT CHANGE UP
SODIUM SERPL-SCNC: 140 MMOL/L — SIGNIFICANT CHANGE UP (ref 135–145)
TROPONIN T SERPL-MCNC: <0.01 NG/ML — SIGNIFICANT CHANGE UP (ref 0–0.01)
WBC # BLD: 7.78 K/UL — SIGNIFICANT CHANGE UP (ref 3.8–10.5)
WBC # FLD AUTO: 7.78 K/UL — SIGNIFICANT CHANGE UP (ref 3.8–10.5)

## 2021-01-09 PROCEDURE — 71045 X-RAY EXAM CHEST 1 VIEW: CPT | Mod: 26

## 2021-01-09 PROCEDURE — 71275 CT ANGIOGRAPHY CHEST: CPT

## 2021-01-09 PROCEDURE — 84484 ASSAY OF TROPONIN QUANT: CPT

## 2021-01-09 PROCEDURE — 99285 EMERGENCY DEPT VISIT HI MDM: CPT | Mod: 25

## 2021-01-09 PROCEDURE — 36415 COLL VENOUS BLD VENIPUNCTURE: CPT

## 2021-01-09 PROCEDURE — 85379 FIBRIN DEGRADATION QUANT: CPT

## 2021-01-09 PROCEDURE — U0005: CPT

## 2021-01-09 PROCEDURE — 80053 COMPREHEN METABOLIC PANEL: CPT

## 2021-01-09 PROCEDURE — 99284 EMERGENCY DEPT VISIT MOD MDM: CPT | Mod: 25

## 2021-01-09 PROCEDURE — 96374 THER/PROPH/DIAG INJ IV PUSH: CPT | Mod: XU

## 2021-01-09 PROCEDURE — U0003: CPT

## 2021-01-09 PROCEDURE — 85610 PROTHROMBIN TIME: CPT

## 2021-01-09 PROCEDURE — 85730 THROMBOPLASTIN TIME PARTIAL: CPT

## 2021-01-09 PROCEDURE — 85025 COMPLETE CBC W/AUTO DIFF WBC: CPT

## 2021-01-09 PROCEDURE — 71045 X-RAY EXAM CHEST 1 VIEW: CPT

## 2021-01-09 PROCEDURE — 71275 CT ANGIOGRAPHY CHEST: CPT | Mod: 26

## 2021-01-09 RX ORDER — ASPIRIN/CALCIUM CARB/MAGNESIUM 324 MG
325 TABLET ORAL ONCE
Refills: 0 | Status: COMPLETED | OUTPATIENT
Start: 2021-01-09 | End: 2021-01-09

## 2021-01-09 RX ORDER — KETOROLAC TROMETHAMINE 30 MG/ML
30 SYRINGE (ML) INJECTION ONCE
Refills: 0 | Status: DISCONTINUED | OUTPATIENT
Start: 2021-01-09 | End: 2021-01-09

## 2021-01-09 RX ADMIN — Medication 30 MILLIGRAM(S): at 13:33

## 2021-01-09 RX ADMIN — Medication 325 MILLIGRAM(S): at 11:18

## 2021-01-09 NOTE — ED ADULT NURSE NOTE - CAS TRG GENERAL NORM CIRC DET
12/21/2018      RE: Danielle Cook  [address]  [facility] [address]       Nephrology Clinic    ASSESSMENT AND RECOMMENDATIONS:     1.CKD: creatinine baseline 1.3-1.4 mg/dl since 2013 with eGFR 60s. Etiology is likely renovascular disease due to longstanding HTN.  Today , creat 1.2 which is higher than last 6 months but certainly in his range.  2. HTN: BP goal is 140/90 OR LESS.Now at goal.      Follow up in 4 months  Because of recent trend of increased creat (may be part of his variability).      REASON FOR VISIT: CKD follow up     HISTORY OF PRESENT ILLNESS:    Danielle Cook is a 70 year old male who presents for CKD follow up. Since last seen , was hospitalized in October for GI bleed caused by varices. Also had a single paracentesis. Now feels well and has no complaints. Denies weakness, edema, loss of appetite, dizziness, dyspnea.    Seen with .    PAST MEDICAL HISTORY:  Past Medical History:   Diagnosis Date     CKD (chronic kidney disease)      CKD (chronic kidney disease) stage 3, GFR 30-59 ml/min (H)      Hep C w/o coma, chronic (H)      Hepatitis C virus infection      Hypertension      PAST SURGICAL HISTORY:  Past Surgical History:   Procedure Laterality Date     COLONOSCOPY N/A 1/19/2018    Procedure: COLONOSCOPY;  COMBINED ESOPHAGOSCOPY, GASTROSCOPY, DUODENOSCOPY (EGD) REMOVE TUMOR/POYP/LESION BY SNARE, CONTROL BLEED,BANDING/LIGATION OF VARICES Colonoscopy;  Surgeon: Xavier Sutton MD;  Location:  GI     ESOPHAGOSCOPY, GASTROSCOPY, DUODENOSCOPY (EGD), COMBINED N/A 11/4/2015    Procedure: COMBINED ESOPHAGOSCOPY, GASTROSCOPY, DUODENOSCOPY (EGD), BIOPSY SINGLE OR MULTIPLE;  Surgeon: Inocente Do MD;  Location:  GI     ESOPHAGOSCOPY, GASTROSCOPY, DUODENOSCOPY (EGD), COMBINED N/A 3/29/2018    Procedure: COMBINED ESOPHAGOSCOPY, GASTROSCOPY, DUODENOSCOPY (EGD);  egd;  Surgeon: Danielle Mendenhall MD;  Location:  GI     ESOPHAGOSCOPY, GASTROSCOPY,  DUODENOSCOPY (EGD), COMBINED N/A 9/10/2018    Procedure: COMBINED ESOPHAGOSCOPY, GASTROSCOPY, DUODENOSCOPY (EGD);  egd;  Surgeon: Xavier Sutton MD;  Location:  OR     MEDICATIONS:  Prescription Medications as of 12/21/2018       Rx Number Disp Refills Start End Last Dispensed Date Next Fill Date Owning Pharmacy    acetaminophen (TYLENOL) 325 MG tablet  100 tablet 3 10/14/2016    [place], [place] - [address] [place] [address]    Sig: Take 1 tablet (325 mg) by mouth every 6 hours as needed for mild pain    Class: E-Prescribe    Notes to Pharmacy: Your can take up to 2000 mg ( no more than 6 tablets) per day.    Route: Oral    amLODIPine (NORVASC) 10 MG tablet    8/18/2018        Class: Historical    ASPIRIN LOW DOSE 81 MG EC tablet    8/6/2018        Class: Historical    benzocaine-menthol (CEPACOL) 15-3.6 MG lozenge  84 lozenge 0 10/12/2018    [address]    Sig: Place 1 lozenge inside cheek every 2 hours as needed for sore throat    Class: E-Prescribe    Route: Buccal    ferrous fumarate (FERRETTS) 324 (106 Fe) MG TABS tablet            Sig: Take by mouth daily    Class: Historical    Route: Oral    furosemide (LASIX) 20 MG tablet  30 tablet 11 11/23/2018    [place], [place] - [facility]    Sig: Take 1 tablet (20 mg) by mouth daily    Class: E-Prescribe    gabapentin (NEURONTIN) 300 MG tablet            Sig: Take 600 mg by mouth 3 times daily     Class: Historical    Route: Oral    levothyroxine (SYNTHROID/LEVOTHROID) 50 MCG tablet    8/15/2018        Class: Historical    lidocaine, viscous, (XYLOCAINE) 2 % solution    1/19/2018        Class: Historical    nortriptyline (PAMELOR) 25 MG capsule    3/4/2018        Class: Historical    omeprazole (PRILOSEC) 40 MG capsule  60 capsule 2 10/12/2018    [address]    Sig: Take 1 capsule (40 mg) by mouth 2 times  "daily    Class: Historical    Route: Oral    oxyCODONE IR (ROXICODONE) 5 MG tablet    1/22/2018        Class: Historical    Earliest Fill Date: 1/22/2018    polyethylene glycol (MIRALAX/GLYCOLAX) powder    6/13/2018        Class: Historical    spironolactone (ALDACTONE) 50 MG tablet  30 tablet 11 11/23/2018    [address]    Sig: Take 1 tablet (50 mg) by mouth daily    Class: E-Prescribe    tamsulosin (FLOMAX) 0.4 MG capsule  60 capsule 5 8/21/2018    [address]    Sig: Take 1 capsule (0.4 mg) by mouth daily    Class: E-Prescribe    Route: Oral         ALLERGIES:    No Known Allergies     SOCIAL HISTORY:   Social History     Socioeconomic History     Marital status:      Spouse name: Not on file     Number of children: Not on file     Years of education: Not on file     Highest education level: Not on file   Social Needs     Financial resource strain: Not on file     Food insecurity - worry: Not on file     Food insecurity - inability: Not on file     Transportation needs - medical: Not on file     Transportation needs - non-medical: Not on file   Occupational History     Not on file   Tobacco Use     Smoking status: Never Smoker     Smokeless tobacco: Never Used   Substance and Sexual Activity     Alcohol use: No     Alcohol/week: 0.0 oz     Drug use: No     Sexual activity: Not on file   Other Topics Concern     Parent/sibling w/ CABG, MI or angioplasty before 65F 55M? Not Asked   Social History Narrative     Not on file     FAMILY MEDICAL HISTORY:   Family History   Problem Relation Age of Onset     Hypertension Father      PHYSICAL EXAM:     /73   Pulse 68   Temp 97.8  F (36.6  C) (Oral)   Ht 1.727 m (5' 8\")   Wt 72 kg (158 lb 12.8 oz)   SpO2 98%   BMI 24.15 kg/m     GENERAL APPEARANCE: alert and no distress, thin, healthy appearing  Head: no tenderness over scalp or paraspinal mm.  EYES: nonicteric  NECK: " supple, no adenopathy  RESP: lungs clear to auscultation   CV: regular rhythm, normal rate, no rub  ABDOMEN: soft, nontender, normal bowel sounds, some abdominal distension (? Non-tense ascites vs obesity)  Extremities : no edema  MS:  no muscle tenderness  SKIN: no rash  NEURO: mentation and speech normal   PSYCH: affect normal/bright     LABS:     Recent Labs   Lab Test 10/31/18  1217 10/23/18  1034 10/12/18  0722 10/11/18  0526  10/09/18  1754  04/30/18  1322  12/15/17  0738  12/16/16  0842  10/06/15  1027    140 141 144   < > 142   < >  --    < > 139   < > 142   < > 140   POTASSIUM 3.8 3.9 3.7 3.9   < > 4.4   < >  --    < > 3.7   < > 4.5   < > 3.8   CHLORIDE 109 108 108 111*   < > 108   < >  --    < > 106   < > 109   < > 109   CO2 24 23 24 23   < > 24   < >  --    < > 26   < > 26   < > 26   ANIONGAP 8 8 9 11   < > 9   < >  --    < > 7   < > 7   < > 6   GLC 93 98 122* 127*   < > 109*   < >  --    < > 99   < > 99   < > 80   BUN 12 8 21 28   < > 43*   < >  --    < > 12   < > 13   < > 9   CR 1.20 1.12 1.07 1.05   < > 1.24   < >  --    < > 1.35*   < > 1.39*   < > 1.27*   GFRESTIMATED 59* 64 68 69   < > 57*   < >  --    < > 52*   < > 50*   < > 56*   GFRESTBLACK 72 78 82 84   < > 69   < >  --    < > 63   < > 61   < > 68   ABDIRASHID 8.4* 7.9* 7.6* 7.8*   < > 8.1*   < >  --    < > 8.1*   < > 8.5   < > 8.2*   MAG  --   --   --   --   --   --   --  2.4*  --   --   --   --   --   --    PHOS  --   --   --   --   --   --   --  2.3*  --  2.9  --  3.2  --  2.8   PROTTOTAL  --  7.4 6.4* 6.6*  --  7.2   < >  --    < >  --    < >  --    < > 7.2   ALBUMIN  --  2.6* 2.6* 2.8*  --  2.8*   < >  --    < > 2.8*   < > 2.8*   < > 2.6*   BILITOTAL  --  2.0* 2.0* 1.2  --  0.9   < >  --    < >  --    < >  --    < > 1.3   ALKPHOS  --  429* 288* 323*  --  376*   < >  --    < >  --    < >  --    < > 174*   AST  --  50* 45 72*  --  138*   < >  --    < >  --    < >  --    < > 77*   ALT  --  43 104* 142*  --  207*   < >  --    < >  --    < >  --     < > 50    < > = values in this interval not displayed.     CBC  Recent Labs   Lab Test 12/21/18  0842 10/23/18  1034 10/12/18  0722 10/11/18  0526   HGB 13.2* 12.1* 11.2* 11.9*   WBC 4.7 4.5 8.0 6.8   RBC 4.34* 3.97* 3.73* 3.99*   HCT 40.2 37.5* 34.9* 37.3*   MCV 93 95 94 94   MCH 30.4 30.5 30.0 29.8   MCHC 32.8 32.3 32.1 31.9   RDW 14.9 19.9* Dimorphic population - unable to calculate Dimorphic population - unable to calculate   * 125* 134* 155     INR  Recent Labs   Lab Test 10/23/18  1034 10/12/18  0722 10/11/18  0526 10/09/18  1754   INR 1.05 1.16* 1.15* 1.05   PTT  --   --   --  22     ABGNo lab results found.   URINE STUDIES  Recent Labs   Lab Test 12/13/18  1318 08/21/18  1550 04/30/18  1327 08/15/14  1047   COLOR Yellow Shikha Yellow Yellow   APPEARANCE Clear Clear Clear Clear   URINEGLC Negative Negative Negative Negative   URINEBILI Negative Negative Negative Negative   URINEKETONE Negative Negative Negative Negative   SG 1.016 1.018 1.020 1.007   UBLD Negative Negative Negative Negative   URINEPH 5.0 6.0 6.0 7.0   PROTEIN Negative Negative Negative Negative   NITRITE Negative Negative Negative Negative   LEUKEST Negative Negative Negative Negative   RBCU <1 <1 <1 10*   WBCU 2 <1 1 8*     Recent Labs   Lab Test 12/15/17  0755 12/16/16  0852 10/06/15  1030 12/12/14  0835 08/15/14  1047 12/31/13  1319 09/24/13  1439   UTPG 0.10 0.10 0.12 0.16 0.16 0.06 0.03     PTH  Recent Labs   Lab Test 04/30/18  1322 12/16/16  0842 08/15/14  1028 09/24/13  1303   PTHI 100* 88* 81* 65     IRON STUDIES  Recent Labs   Lab Test 04/30/18  1322 10/18/17  1209 12/16/16  0842 08/15/14  1028 09/24/13  1303   IRON 20* 42 47 27* 38    374 414 437* 447*   IRONSAT 5* 11* 11* 6* 9*   MARYAM 8* 13* 10* 14* 19*     Beto Alvarado MD    Strong peripheral pulses/Capillary refill less/equal to 2 seconds

## 2021-01-09 NOTE — ED ADULT TRIAGE NOTE - CHIEF COMPLAINT QUOTE
Pt c/o pain under left breast over past few days, worse with movement. Pt states she now has chest pressure and associated shortness of breath today. Denies abdominal pain, n/v/d, fevers, cough. Speaking clearly in full sentences.

## 2021-01-09 NOTE — ED PROVIDER NOTE - PATIENT PORTAL LINK FT
You can access the FollowMyHealth Patient Portal offered by [facility] by registering at the following website: [email or web address]. By joining [employer]’s FollowMyHealth portal, you will also be able to view your health information using other applications (apps) compatible with our system.

## 2021-01-09 NOTE — ED PROVIDER NOTE - NSFOLLOWUPINSTRUCTIONS_ED_ALL_ED_FT
Chest pain    Please take your blood pressure medications daily.  Please take a baby aspirin daily.  Please follow up with your pmd and a cardiologist for further evaluation and treatment.   Return for increased pain, difficulty breathing, palpitations, any other concerns.     Chest Pain    Chest pain can be caused by many different conditions which may or may not be dangerous. Causes include heartburn, lung infections, heart attack, blood clot in lungs, skin infections, strain or damage to muscle, cartilage, or bones, etc. In addition to a history and physical examination, an electrocardiogram (ECG) or other lab tests may have been performed to determine the cause of your chest pain. Follow up with your primary care provider or with a cardiologist as instructed.     SEEK IMMEDIATE MEDICAL CARE IF YOU HAVE ANY OF THE FOLLOWING SYMPTOMS: worsening chest pain, coughing up blood, unexplained back/neck/jaw pain, severe abdominal pain, dizziness or lightheadedness, fainting, shortness of breath, sweaty or clammy skin, vomiting, or racing heart beat. These symptoms may represent a serious problem that is an emergency. Do not wait to see if the symptoms will go away. Get medical help right away. Call 911 and do not drive yourself to the hospital.

## 2021-01-09 NOTE — ED PROVIDER NOTE - CLINICAL SUMMARY MEDICAL DECISION MAKING FREE TEXT BOX
Pt c/o L lower rib/chest pain w movement w mild reproducible ttp on exam.  I suspect msk. No current pain. Pt also c/o sscp w exertion and laying in bed at night w lemus as well - concerning for acs, chf.  + cad risks - htn, fh.  No pe risks but ? pe.  EKG w/o stemi.  Plan labs, cxr, monitor; discuss poss admit w cardiology if eval neg for pe.  Reassess.

## 2021-01-09 NOTE — ED PROVIDER NOTE - WR INTERPRETATION 1
CXR negative - No infiltrates, No consolidation, No atelectasis seen, cardiomegaly unchanged from prior cxr

## 2021-01-09 NOTE — ED ADULT NURSE NOTE - NSIMPLEMENTINTERV_GEN_ALL_ED
Implemented All Universal Safety Interventions:  [place] to call system. Call bell, personal items and telephone within reach. Instruct patient to call for assistance. Room bathroom lighting operational. Non-slip footwear when patient is off stretcher. Physically safe environment: no spills, clutter or unnecessary equipment. Stretcher in lowest position, wheels locked, appropriate side rails in place.

## 2021-01-09 NOTE — ED PROVIDER NOTE - OBJECTIVE STATEMENT
51 yo female h/o htn c/o intermittent cp and sob x 1 mo.  Pt notes L lower rib/chest wall pain w certain movements like bending over that eventually subside when she changes position x 1 mo.  Pt also notes sscp worse w exertion and laying in bed at night associated w sob x  ~ 3 wk.  Pt denies tob, hld, dm, h/o cad; no prior cardiac testing.  + fh cad (father, 50's mi/stents).  No le edema, pain, no h/o pe/dvt.  No fever, uri sx, cough, night sweats, wt loss.  No current cp but felt sscp when in the park walking today and also L lateral lower chest pain when she bent over.  Pt states her bp has been high and is followed weekly by her pmd who last changed it by adding nifedipine and increasing her losartan 2 wk ago.

## 2021-01-09 NOTE — ED PROVIDER NOTE - PROGRESS NOTE DETAILS
CXR w cardiomegaly similar to prior.  Trop neg.  Ddimer slightly elevated, cta neg for pe (limited stud).  HEART score 5.    Discussed w cardiology - will admit. Pt declines admission.  Pt understands my concern for acs and risk to her for undiagnosed/treated cardiac problem.  Patient desires to leave emergency department against medical advice, prior to completion of my desired evaluation and treatment plan.  Patient's mental status is normal, patient is fully alert and oriented x person, place and time.  Patient demonstrates clear reasoning capabilities and capacity to make this decision.  Patient fully understands the explained risks involved with this decision including worsening of medical condition, missed and or delayed diagnosis, permanent disability and death.  All alternative options given to patient and still desires discharge against medical advice from ED and will follow up as an outpatient.  Patient given the option to return to ED at any time to have further evaluation and treatment. Pt declines admission.  Pt noted to have high bp reading - states she's due for her afternoon bp med; pt is taking her own bp med.  No current cp, ha.  Pt understands my concern for acs and risk to her for undiagnosed/treated cardiac problem.  Patient desires to leave emergency department against medical advice, prior to completion of my desired evaluation and treatment plan.  Patient's mental status is normal, patient is fully alert and oriented x person, place and time.  Patient demonstrates clear reasoning capabilities and capacity to make this decision.  Patient fully understands the explained risks involved with this decision including worsening of medical condition, missed and or delayed diagnosis, permanent disability and death.  All alternative options given to patient and still desires discharge against medical advice from ED and will follow up as an outpatient.  Patient given the option to return to ED at any time to have further evaluation and treatment.

## 2021-01-09 NOTE — ED PROVIDER NOTE - PSH
History of   x 2  History of shoulder surgery  Right Rotator cuff repair  History of tubal ligation

## 2021-01-09 NOTE — ED ADULT NURSE NOTE - OBJECTIVE STATEMENT
Pt reports left sided pain underneath breast "worse when I bend over" for one month, also reports sob starting today worse while walking, denies n/v, dizziness, chills, fever, cough.

## 2021-04-19 ENCOUNTER — EMERGENCY (EMERGENCY)
Facility: HOSPITAL | Age: 51
LOS: 1 days | Discharge: HOME | End: 2021-04-19
Attending: EMERGENCY MEDICINE | Admitting: EMERGENCY MEDICINE
Payer: COMMERCIAL

## 2021-04-19 VITALS
HEART RATE: 68 BPM | DIASTOLIC BLOOD PRESSURE: 95 MMHG | TEMPERATURE: 98 F | OXYGEN SATURATION: 98 % | RESPIRATION RATE: 18 BRPM | WEIGHT: 257.06 LBS | SYSTOLIC BLOOD PRESSURE: 169 MMHG | HEIGHT: 65 IN

## 2021-04-19 VITALS
TEMPERATURE: 98 F | RESPIRATION RATE: 18 BRPM | DIASTOLIC BLOOD PRESSURE: 95 MMHG | OXYGEN SATURATION: 100 % | HEART RATE: 73 BPM | SYSTOLIC BLOOD PRESSURE: 169 MMHG

## 2021-04-19 DIAGNOSIS — Z88.0 ALLERGY STATUS TO PENICILLIN: ICD-10-CM

## 2021-04-19 DIAGNOSIS — Z88.8 ALLERGY STATUS TO OTHER DRUGS, MEDICAMENTS AND BIOLOGICAL SUBSTANCES STATUS: ICD-10-CM

## 2021-04-19 DIAGNOSIS — Z79.899 OTHER LONG TERM (CURRENT) DRUG THERAPY: ICD-10-CM

## 2021-04-19 DIAGNOSIS — Z98.890 OTHER SPECIFIED POSTPROCEDURAL STATES: Chronic | ICD-10-CM

## 2021-04-19 DIAGNOSIS — Z98.891 HISTORY OF UTERINE SCAR FROM PREVIOUS SURGERY: Chronic | ICD-10-CM

## 2021-04-19 DIAGNOSIS — I10 ESSENTIAL (PRIMARY) HYPERTENSION: ICD-10-CM

## 2021-04-19 DIAGNOSIS — R51.9 HEADACHE, UNSPECIFIED: ICD-10-CM

## 2021-04-19 DIAGNOSIS — M79.662 PAIN IN LEFT LOWER LEG: ICD-10-CM

## 2021-04-19 DIAGNOSIS — Z98.51 TUBAL LIGATION STATUS: Chronic | ICD-10-CM

## 2021-04-19 DIAGNOSIS — E66.9 OBESITY, UNSPECIFIED: ICD-10-CM

## 2021-04-19 PROCEDURE — 93971 EXTREMITY STUDY: CPT

## 2021-04-19 PROCEDURE — 93971 EXTREMITY STUDY: CPT | Mod: 26,LT

## 2021-04-19 PROCEDURE — 99284 EMERGENCY DEPT VISIT MOD MDM: CPT | Mod: 25

## 2021-04-19 PROCEDURE — 99284 EMERGENCY DEPT VISIT MOD MDM: CPT

## 2021-04-19 NOTE — ED ADULT NURSE NOTE - CHPI ED NUR SYMPTOMS NEG
no abrasion/no back pain/no bruising/no deformity/no difficulty bearing weight/no fever/no numbness/no tingling

## 2021-04-19 NOTE — ED PROVIDER NOTE - CLINICAL SUMMARY MEDICAL DECISION MAKING FREE TEXT BOX
50F PMH HTN p/w LLE pain. Pain to L anterior leg and calf, x2w, worse w/ certain movements. Received J and J vaccine on 4/1. Concerned about blood clot so came to ED. On ROS pt also mentions generalized HA, intermittent, none currently, gradual onset. No other systemic symptoms. Hypertensive, other vitals wnl. Exam as above.  ddx: Clinically benign HA. Possible DVT.  US, reassess.  Essentially asymptomatic HTN.

## 2021-04-19 NOTE — ED ADULT TRIAGE NOTE - CHIEF COMPLAINT QUOTE
Pt presents reporting pain to the left knee, left upper calf. Pt reports pain and swelling present for 2 weeks. Pt reprots receiving J&J vaccine on 4/1.

## 2021-04-19 NOTE — ED ADULT NURSE NOTE - OBJECTIVE STATEMENT
51 yo F presents to ED co pain to L knee and L leg, pt received foster and foster vaccine on 4/1. Pt A&Ox4, ambulatory with steady gait, speaking in clear/full sentences, no acute distress, vital signs stable. Small amount of edema noted to L leg, pt denies recent falls or trauma. sensation intact, cap refill < 2 seconds.

## 2021-04-19 NOTE — ED PROVIDER NOTE - NSFOLLOWUPINSTRUCTIONS_ED_ALL_ED_FT
Continue to take medications as prescribed. Your doctor may need to make medication changes if your blood pressure continues to be high.  Follow up with primary doctor within 1-2 days.     Hypertension    Hypertension, commonly called high blood pressure, is when the force of blood pumping through your arteries is too strong. Hypertension forces your heart to work harder to pump blood. Your arteries may become narrow or stiff. Having untreated or uncontrolled hypertension for a long period of time can cause heart attack, stroke, kidney disease, and other problems. If started on a medication, take exactly as prescribed by your health care professional. Maintain a healthy lifestyle and follow up with your primary care physician.    SEEK IMMEDIATE MEDICAL CARE IF YOU HAVE ANY OF THE FOLLOWING SYMPTOMS: severe headache, confusion, chest pain, abdominal pain, vomiting, or shortness of breath. Can take tylenol 650mg every 6hrs as needed for pain.    Stay well hydrated.    Return for fevers, persistent vomit, uncontrolled pain, worsening breathing, worsening lightheaded, worsening swelling, spreading redness.    Follow up with primary doctor within 1-2 days.     Discuss with your doctor possibly repeating ultrasound in 1 week if still symptomatic.     Continue to take medications as prescribed. Your doctor may need to make medication changes if your blood pressure continues to be high.      Hypertension    Hypertension, commonly called high blood pressure, is when the force of blood pumping through your arteries is too strong. Hypertension forces your heart to work harder to pump blood. Your arteries may become narrow or stiff. Having untreated or uncontrolled hypertension for a long period of time can cause heart attack, stroke, kidney disease, and other problems. If started on a medication, take exactly as prescribed by your health care professional. Maintain a healthy lifestyle and follow up with your primary care physician.    SEEK IMMEDIATE MEDICAL CARE IF YOU HAVE ANY OF THE FOLLOWING SYMPTOMS: severe headache, confusion, chest pain, abdominal pain, vomiting, or shortness of breath.

## 2021-04-19 NOTE — ED PROVIDER NOTE - OBJECTIVE STATEMENT
50F PMH HTN p/w LLE pain. Pain to L anterior leg and calf, x2w, worse w/ certain movements. Received J and J vaccine on 4/1. Concerned about blood clot so came to ED. On ROS pt also mentions generalized HA, intermittent, none currently, gradual onset.   Denies f/c, SOB/CP, NVD, abd pain, urinary complaints, focal weakness/numbness, rashes.

## 2021-04-19 NOTE — ED PROVIDER NOTE - PHYSICAL EXAMINATION
minimal L calf ttp, normal equal distal pulses.   No crepitus, firmness, induration, fluctuance. Skin is normal temp. No erythema/warmth. No obvious skin breaks.   PERRL, EOMI, no nystagmus. CN intact. Strength 5/5. Steady unassisted gait. No pronator drift. Sensation intact. Normal speech, no dysarthria. minimal L calf ttp, normal equal distal pulses, no LE edema  No crepitus, firmness, induration, fluctuance. Skin is normal temp. No erythema/warmth. No obvious skin breaks.   PERRL, EOMI, no nystagmus. CN intact. Strength 5/5. Steady unassisted gait. No pronator drift. Sensation intact. Normal speech, no dysarthria.

## 2021-04-19 NOTE — ED PROVIDER NOTE - PROGRESS NOTE DETAILS
[place]: US w/ no acute pathology. low clinical suspicion. Clinically no indication for further emergent ED workup or hospitalization at this time. Comfortable for dc, outpt f/u.

## 2021-04-19 NOTE — ED PROVIDER NOTE - PATIENT PORTAL LINK FT
You can access the FollowMyHealth Patient Portal offered by [facility] by registering at the following website: [email or web address]. By joining [employer]’s FollowMyHealth portal, you will also be able to view your health information using other applications (apps) compatible with our system.

## 2021-11-03 NOTE — ED PROVIDER NOTE - NS ED ATTENDING STATEMENT MOD
"[facility]  Palliative Care Consultation Note    Patient: Dima Olivas  Date of Admission:  11/2/2021    Requesting Clinician / Team: Haily Mendoza  Reason for consult: Goals of care, Decisional support    Recommendations:    Initial meeting with patient and wife today. He does seem to understand that his health condition is poor, however, I'm not sure he fully grasps what this means from a prognosis standpoint. He stated that chemo is \"on hold\" due to his liver and kidney issues. He was able to express that he thinks he would choose quality over quantity of time if he had to.    He has never completed a healthcare directive. We will provide a blank copy for him, and discussed using this as a guide for conversations about his healthcare goals/wishes.    Pain control is fluctuating, does seem to be doing better with IV dilaudid Q2H PRN. As he is able to take more PO, could consider adding oxycodone either 5mg tablets PO or intensol solution 5mg SL Q6H PRN.    He is having some outer left thigh pain- could consider ice/heat vs. Lidocaine patch.    Will attempt to address code status tomorrow.        Thank you for the opportunity to participate in the care of this patient and family. Our team: will continue to follow.     During regular M-F work hours -- if you are not sure who specifically to contact -- please contact us by sending a text page to our team consult pager at [phone].    After regular work hours and on weekends/holidays, you can call our answering service at [phone]. Also, who's on call for us is available in [facility] Smart Web.       Assessments:  Dima Olivas is a 57 year old male with a past medical history significant for cirrhosis 2/2 alcohol use and untreated Hep C and HCC s/p 1st round of chemotherapy Bevacizumab and Tecentriq on 10/22/2021. He was admitted for acute cirrhosis decompensation in the setting of hepatic hemorrhage likely 2/2 HCC " lesion presenting with significant hyperbili, MICHELLE, ABLA, and pancreatitis. PC was consulted for assistance with GOC.     Today, the patient was seen for:  HCC  Decompensated cirrhosis  Abdominal pain  Ascites  Pancreatitis  Goals of care  Patient and family support    Prognosis, Goals, & Planning:      Functional Status just prior to hospitalization: 1 (Restricted in physically strenuous activity but ambulatory and able to carry out work of a light or sedentary nature)      Prognosis, Goals, and/or Advance Care Planning were addressed today: Yes When asked about his understanding from what the other doctors have told him, he stated that he knows his situation is not good. He knows that he is currently not a candidate for more chemo. He has not completed a healthcare directive but states it would be a good idea. He feels that if presented with the option, he would likely choose quality of life over quantity.      Patient's decision making preferences: shared with support from loved ones          Patient has decision-making capacity today for complex decisions: Yes, though I recommend wife be present for all complex conversations.            I have concerns about the patient/family's health literacy today: No           Patient has a completed Health Care Directive: No.       Code status: Full Code    Coping, Meaning, & Spirituality:   Mood, coping, and/or meaning in the context of serious illness were addressed today: Yes  Patient admitted that he grew up in the [employer], and it used to be a big part of his life. While his feelings about jeremiah and [employer] have changed some, he has expressed interest in chatting with a  here.    Social:     Living situation: [place] with his wife and 2 adult sons, as well as black lab puppy Kerri.    Moore family / caregivers: Wife    Substance use history: ETOH, activitely drinking up until a few weeks ago    History of Present Illness:  History gathered today from: patient,  "family/loved ones, medical chart    Patient reports he was doing fairly well up until a few weeks ago, was even able to go [employer] hunting with son and dog. Received chemo on 10/22 and states that things started \"spiraling\" downhill from there.     Over the last few days, developed worsening abdominal pain and distention as well as jaundice. Wife states it was the worsening pain that brought them to the hospital.    He had a paracentesis which was positive for very bloody fluid, leading to concern for hepatic hemorrhage from HCC lesion. He was also found to have pancreatitis and is essentially NPO other than for medications. No evidence for biliary obstruction on imaging.    Key Palliative Symptom Data:  Pain location: Abdomen  # Pain severity the last 12 hours: moderate    Patient is on opioids: bowels not assessed today.    ROS:  Besides above, a complete 10+ ROS was reviewed and is unremarkable.     Past Medical History:  Past Medical History:   Diagnosis Date     Cirrhosis (H) 01/2020     Hepatitis C 2009     Hx of intravenous drug use in remission     quit 30 yrs ago     Smoker      Past Surgical History:  Past Surgical History:   Procedure Laterality Date     ESOPHAGOSCOPY, GASTROSCOPY, DUODENOSCOPY (EGD), COMBINED N/A 9/24/2021    Procedure: ESOPHAGOGASTRODUODENOSCOPY (EGD);  Surgeon: Salvador Covington MD;  Location: [facility] OR     IR LIVER BIOPSY PERCUTANEOUS  3/11/2020     NO HISTORY OF SURGERY        Family History:  Family History   Problem Relation Age of Onset     Unknown/Adopted Mother      Unknown/Adopted Father          Allergies:  No Known Allergies     Medications:  I have reviewed this patient's medication profile and medications from this hospitalization.     Noted meds are:  IV dilaudid 1.7mg in 24 hours  Lactulose  Protonix  Zosyn  Melatonin  Zofran    Physical Exam:  Vital Signs: Temp: 98.1  F (36.7  C) Temp src: Oral BP: 123/64 Pulse: 84   Resp: 16 SpO2: 98 % O2 Device: None (Room air)  "   Weight: 192 lbs 1.6 oz  Gen: Lying in bed, NAD  Eyes: Scleral icterus present.  HENT: No head trauma; Dry mucous membranes.  Resp: No increased work of breathing.  Abd: Distended, tight, mildly tender to palpation.  Msk: No gross deformity, Tenderness over Left IT band.  Skin:  Positive jaundice over all visible skin.  Ext: warm, well perfused.   Neuro: A&O x 3 though mentation a little slowed; CN II-XII grossly intact.   Mental status/Psych: Does not seem anxious or depressed.    Data reviewed:  Reviewed recent labs and pertinent imaging  Labs 11/3- Cr 3.77, GFR 16, Albumin 2, Tot Bili 24.4, LFTs elevated. WBC 12.1, Hgb 6.9, Plts 79.    Imaging 11/2- CT CAP IMPRESSION:   1. Hemorrhage - 3.8 x 6.8 x 7.3 cm hematoma in the anterior abdomen  extending inferiorly from large mass in segment 2/3 of the liver.  No active extravasation is demonstrated on CT scan.   2. Moderate to large volume ascites.  3. Cirrhotic configuration to the liver without significant change in multiple nonenhancing and enhancing lesions scattered throughout the hepatic parenchyma, which are better characterized on the comparison abdominal MRI including a previous LI-RADS M lesion in hepatic segment VIII.  4. Portal venous thrombus involving the main and left portal veins.  5. Portal hypertension with moderate to large volume ascites, splenomegaly, recanalized umbilical vein, and portosystemic shunts.  6. Bowel wall thickening involving multiple loops of small bowel in the left hemiabdomen likely reactive due to the associated ascites.  7. Cholelithiasis without evidence for acute cholecystitis.    Thank you for the opportunity to continue to participate in the care of this patient and family.  Please feel free to contact on-call palliative provider with any emergent needs.  We can be reached via team pager [phone] (answered 8-4:30 Monday-Friday); after-hours answering service ([phone]);     Anu Noriega DO / Palliative  Medicine / Pager [phone] / [facility] Inpatient Team Consult Pager [phone] (answered 8am-430pm M-F) - ok to text page via [employer] / After-Hours Answering Service [phone] / Palliative Clinic in the [facility] at the [facility] - [phone] (scheduling); [phone] (triage).    60 mins spent in reviewing record, patient examination and counseling (FTF 1:35-2:07PM) re: goals of care and pain, and documentation.  >50% spent in counseling re: symptom management and goals of care.      Attending Only

## 2022-07-08 NOTE — ASU PREOP CHECKLIST - WAS PATIENT ON BETA BLOCKER?
Medical Assistant Note:  Bonny Raymundo presents today for blood draw.    Patient seen by provider today: No.   present during visit today: Not Applicable.    Concerns: No Concerns.    Procedure:  Lab draw site: lac, Needle type: bf, Gauge: 23.    Post Assessment:  Labs drawn without difficulty: Yes.    Discharge Plan:  Departure Mode: Ambulatory.    Face to Face Time: 5 min.    Bianca King, CMA           No

## 2022-09-26 NOTE — ED ADULT NURSE NOTE - NEURO ASSESSMENT
09/26/22      Ashlee Alvarez   [place] [address]      My name is Emi Virgen RN.  I am an Outpatient Care Manager working with [facility].  I provide Care Management Services at no extra cost to you. I can support you to follow your doctor’s plan of care.  I can arrange the services needed to help you do the things you want and help you stay as healthy as possible.     As your care manager, I work with you and Demetrio Huang MD to help you get the best care. My job is to:    · Help you understand the type of care you need   · Make sure you know where you can find the treatments you need   · Assist you/your family in finding resources to meet your healthcare needs.   · Coordinate/set up community resources   · Assist you with maximizing your health care benefits     I will call in a few days to talk about your needs and look forward to speaking with you soon.  If you have any questions or if I can help in any way, please call me at [phone] and leave a private voicemail message.      Sincerely,     Emi Virgen RN  Care Manager  [facility]   
WDL

## 2022-12-14 NOTE — ED PROVIDER NOTE - MOUTH NORMAL
Quality 226: Preventive Care And Screening: Tobacco Use: Screening And Cessation Intervention: Patient screened for tobacco use and is an ex/non-smoker Quality 110: Preventive Care And Screening: Influenza Immunization: Influenza Immunization previously received during influenza season Detail Level: Detailed normal mucosa

## 2024-01-16 ENCOUNTER — INPATIENT (INPATIENT)
Facility: HOSPITAL | Age: 54
LOS: 1 days | Discharge: HOME | DRG: 101 | End: 2024-01-18
Attending: PSYCHIATRY & NEUROLOGY | Admitting: PSYCHIATRY & NEUROLOGY
Payer: COMMERCIAL

## 2024-01-16 VITALS
OXYGEN SATURATION: 90 % | TEMPERATURE: 99 F | WEIGHT: 229.94 LBS | SYSTOLIC BLOOD PRESSURE: 141 MMHG | DIASTOLIC BLOOD PRESSURE: 83 MMHG | RESPIRATION RATE: 16 BRPM | HEART RATE: 116 BPM

## 2024-01-16 DIAGNOSIS — Z98.890 OTHER SPECIFIED POSTPROCEDURAL STATES: Chronic | ICD-10-CM

## 2024-01-16 DIAGNOSIS — Z98.891 HISTORY OF UTERINE SCAR FROM PREVIOUS SURGERY: Chronic | ICD-10-CM

## 2024-01-16 DIAGNOSIS — Z98.51 TUBAL LIGATION STATUS: Chronic | ICD-10-CM

## 2024-01-16 LAB
ALBUMIN SERPL ELPH-MCNC: 4.2 G/DL — SIGNIFICANT CHANGE UP (ref 3.3–5)
ALP SERPL-CCNC: 126 U/L — HIGH (ref 40–120)
ALT FLD-CCNC: 18 U/L — SIGNIFICANT CHANGE UP (ref 10–45)
ANION GAP SERPL CALC-SCNC: 12 MMOL/L — SIGNIFICANT CHANGE UP (ref 5–17)
APPEARANCE UR: CLEAR — SIGNIFICANT CHANGE UP
AST SERPL-CCNC: 29 U/L — SIGNIFICANT CHANGE UP (ref 10–40)
BACTERIA # UR AUTO: ABNORMAL /HPF
BASOPHILS # BLD AUTO: 0.03 K/UL — SIGNIFICANT CHANGE UP (ref 0–0.2)
BASOPHILS NFR BLD AUTO: 0.3 % — SIGNIFICANT CHANGE UP (ref 0–2)
BILIRUB SERPL-MCNC: 0.4 MG/DL — SIGNIFICANT CHANGE UP (ref 0.2–1.2)
BILIRUB UR-MCNC: NEGATIVE — SIGNIFICANT CHANGE UP
BUN SERPL-MCNC: 12 MG/DL — SIGNIFICANT CHANGE UP (ref 7–23)
CALCIUM SERPL-MCNC: 9.3 MG/DL — SIGNIFICANT CHANGE UP (ref 8.4–10.5)
CHLORIDE SERPL-SCNC: 99 MMOL/L — SIGNIFICANT CHANGE UP (ref 96–108)
CO2 SERPL-SCNC: 25 MMOL/L — SIGNIFICANT CHANGE UP (ref 22–31)
COLOR SPEC: YELLOW — SIGNIFICANT CHANGE UP
CREAT SERPL-MCNC: 0.92 MG/DL — SIGNIFICANT CHANGE UP (ref 0.5–1.3)
DIFF PNL FLD: ABNORMAL
EGFR: 74 ML/MIN/1.73M2 — SIGNIFICANT CHANGE UP
EOSINOPHIL # BLD AUTO: 0.01 K/UL — SIGNIFICANT CHANGE UP (ref 0–0.5)
EOSINOPHIL NFR BLD AUTO: 0.1 % — SIGNIFICANT CHANGE UP (ref 0–6)
ETHANOL SERPL-MCNC: <10 MG/DL — SIGNIFICANT CHANGE UP (ref 0–10)
FLUAV H1 2009 PAND RNA SPEC QL NAA+PROBE: DETECTED
GLUCOSE SERPL-MCNC: 85 MG/DL — SIGNIFICANT CHANGE UP (ref 70–99)
GLUCOSE UR QL: NEGATIVE MG/DL — SIGNIFICANT CHANGE UP
HCT VFR BLD CALC: 39.5 % — SIGNIFICANT CHANGE UP (ref 34.5–45)
HGB BLD-MCNC: 13.3 G/DL — SIGNIFICANT CHANGE UP (ref 11.5–15.5)
IMM GRANULOCYTES NFR BLD AUTO: 0.4 % — SIGNIFICANT CHANGE UP (ref 0–0.9)
KETONES UR-MCNC: NEGATIVE MG/DL — SIGNIFICANT CHANGE UP
LACTATE SERPL-SCNC: 1.8 MMOL/L — SIGNIFICANT CHANGE UP (ref 0.5–2)
LEUKOCYTE ESTERASE UR-ACNC: ABNORMAL
LYMPHOCYTES # BLD AUTO: 0.5 K/UL — LOW (ref 1–3.3)
LYMPHOCYTES # BLD AUTO: 5.2 % — LOW (ref 13–44)
MAGNESIUM SERPL-MCNC: 2.3 MG/DL — SIGNIFICANT CHANGE UP (ref 1.6–2.6)
MCHC RBC-ENTMCNC: 30.4 PG — SIGNIFICANT CHANGE UP (ref 27–34)
MCHC RBC-ENTMCNC: 33.7 GM/DL — SIGNIFICANT CHANGE UP (ref 32–36)
MCV RBC AUTO: 90.2 FL — SIGNIFICANT CHANGE UP (ref 80–100)
MONOCYTES # BLD AUTO: 0.86 K/UL — SIGNIFICANT CHANGE UP (ref 0–0.9)
MONOCYTES NFR BLD AUTO: 9 % — SIGNIFICANT CHANGE UP (ref 2–14)
NEUTROPHILS # BLD AUTO: 8.09 K/UL — HIGH (ref 1.8–7.4)
NEUTROPHILS NFR BLD AUTO: 85 % — HIGH (ref 43–77)
NITRITE UR-MCNC: NEGATIVE — SIGNIFICANT CHANGE UP
NRBC # BLD: 0 /100 WBCS — SIGNIFICANT CHANGE UP (ref 0–0)
PH UR: 7 — SIGNIFICANT CHANGE UP (ref 5–8)
PHOSPHATE SERPL-MCNC: 2.3 MG/DL — LOW (ref 2.5–4.5)
PLATELET # BLD AUTO: 213 K/UL — SIGNIFICANT CHANGE UP (ref 150–400)
POTASSIUM SERPL-MCNC: 3.7 MMOL/L — SIGNIFICANT CHANGE UP (ref 3.5–5.3)
POTASSIUM SERPL-SCNC: 3.7 MMOL/L — SIGNIFICANT CHANGE UP (ref 3.5–5.3)
PROT SERPL-MCNC: 8.3 G/DL — SIGNIFICANT CHANGE UP (ref 6–8.3)
PROT UR-MCNC: NEGATIVE MG/DL — SIGNIFICANT CHANGE UP
RAPID RVP RESULT: DETECTED
RBC # BLD: 4.38 M/UL — SIGNIFICANT CHANGE UP (ref 3.8–5.2)
RBC # FLD: 13.2 % — SIGNIFICANT CHANGE UP (ref 10.3–14.5)
RBC CASTS # UR COMP ASSIST: 1 /HPF — SIGNIFICANT CHANGE UP (ref 0–4)
RVP NOTIFY: SIGNIFICANT CHANGE UP
SARS-COV-2 RNA SPEC QL NAA+PROBE: SIGNIFICANT CHANGE UP
SODIUM SERPL-SCNC: 136 MMOL/L — SIGNIFICANT CHANGE UP (ref 135–145)
SP GR SPEC: 1 — SIGNIFICANT CHANGE UP (ref 1–1.03)
SQUAMOUS # UR AUTO: 0 /HPF — SIGNIFICANT CHANGE UP (ref 0–5)
UROBILINOGEN FLD QL: 0.2 MG/DL — SIGNIFICANT CHANGE UP (ref 0.2–1)
WBC # BLD: 9.53 K/UL — SIGNIFICANT CHANGE UP (ref 3.8–10.5)
WBC # FLD AUTO: 9.53 K/UL — SIGNIFICANT CHANGE UP (ref 3.8–10.5)
WBC UR QL: 0 /HPF — SIGNIFICANT CHANGE UP (ref 0–5)

## 2024-01-16 PROCEDURE — G1004: CPT

## 2024-01-16 PROCEDURE — 99223 1ST HOSP IP/OBS HIGH 75: CPT

## 2024-01-16 PROCEDURE — 70450 CT HEAD/BRAIN W/O DYE: CPT | Mod: 26,MG

## 2024-01-16 PROCEDURE — 71045 X-RAY EXAM CHEST 1 VIEW: CPT | Mod: 26

## 2024-01-16 PROCEDURE — 99285 EMERGENCY DEPT VISIT HI MDM: CPT

## 2024-01-16 RX ORDER — ESCITALOPRAM OXALATE 10 MG/1
10 TABLET, FILM COATED ORAL DAILY
Refills: 0 | Status: DISCONTINUED | OUTPATIENT
Start: 2024-01-16 | End: 2024-01-18

## 2024-01-16 RX ORDER — ESCITALOPRAM OXALATE 10 MG/1
1 TABLET, FILM COATED ORAL
Refills: 0 | DISCHARGE

## 2024-01-16 RX ORDER — METOCLOPRAMIDE HCL 10 MG
10 TABLET ORAL ONCE
Refills: 0 | Status: COMPLETED | OUTPATIENT
Start: 2024-01-16 | End: 2024-01-16

## 2024-01-16 RX ORDER — CEFTRIAXONE 500 MG/1
1000 INJECTION, POWDER, FOR SOLUTION INTRAMUSCULAR; INTRAVENOUS ONCE
Refills: 0 | Status: COMPLETED | OUTPATIENT
Start: 2024-01-16 | End: 2024-01-16

## 2024-01-16 RX ORDER — ACETAMINOPHEN 500 MG
975 TABLET ORAL EVERY 6 HOURS
Refills: 0 | Status: DISCONTINUED | OUTPATIENT
Start: 2024-01-16 | End: 2024-01-18

## 2024-01-16 RX ORDER — ENOXAPARIN SODIUM 100 MG/ML
40 INJECTION SUBCUTANEOUS EVERY 24 HOURS
Refills: 0 | Status: DISCONTINUED | OUTPATIENT
Start: 2024-01-16 | End: 2024-01-17

## 2024-01-16 RX ORDER — KETOROLAC TROMETHAMINE 30 MG/ML
15 SYRINGE (ML) INJECTION ONCE
Refills: 0 | Status: DISCONTINUED | OUTPATIENT
Start: 2024-01-16 | End: 2024-01-16

## 2024-01-16 RX ORDER — LEVETIRACETAM 250 MG/1
1000 TABLET, FILM COATED ORAL ONCE
Refills: 0 | Status: DISCONTINUED | OUTPATIENT
Start: 2024-01-16 | End: 2024-01-16

## 2024-01-16 RX ORDER — SODIUM CHLORIDE 9 MG/ML
1000 INJECTION INTRAMUSCULAR; INTRAVENOUS; SUBCUTANEOUS ONCE
Refills: 0 | Status: COMPLETED | OUTPATIENT
Start: 2024-01-16 | End: 2024-01-16

## 2024-01-16 RX ORDER — LOSARTAN POTASSIUM 100 MG/1
50 TABLET, FILM COATED ORAL DAILY
Refills: 0 | Status: DISCONTINUED | OUTPATIENT
Start: 2024-01-16 | End: 2024-01-18

## 2024-01-16 RX ORDER — AZITHROMYCIN 500 MG/1
500 TABLET, FILM COATED ORAL ONCE
Refills: 0 | Status: COMPLETED | OUTPATIENT
Start: 2024-01-16 | End: 2024-01-16

## 2024-01-16 RX ORDER — ACETAMINOPHEN 500 MG
1000 TABLET ORAL ONCE
Refills: 0 | Status: COMPLETED | OUTPATIENT
Start: 2024-01-16 | End: 2024-01-16

## 2024-01-16 RX ORDER — ACETAMINOPHEN 500 MG
975 TABLET ORAL ONCE
Refills: 0 | Status: COMPLETED | OUTPATIENT
Start: 2024-01-16 | End: 2024-01-16

## 2024-01-16 RX ORDER — LOSARTAN POTASSIUM 100 MG/1
0 TABLET, FILM COATED ORAL
Qty: 0 | Refills: 0 | DISCHARGE

## 2024-01-16 RX ORDER — NIFEDIPINE 30 MG
0 TABLET, EXTENDED RELEASE 24 HR ORAL
Qty: 0 | Refills: 0 | DISCHARGE

## 2024-01-16 RX ADMIN — Medication 1000 MILLIGRAM(S): at 19:30

## 2024-01-16 RX ADMIN — Medication 15 MILLIGRAM(S): at 13:59

## 2024-01-16 RX ADMIN — CEFTRIAXONE 100 MILLIGRAM(S): 500 INJECTION, POWDER, FOR SOLUTION INTRAMUSCULAR; INTRAVENOUS at 09:40

## 2024-01-16 RX ADMIN — Medication 975 MILLIGRAM(S): at 23:43

## 2024-01-16 RX ADMIN — ENOXAPARIN SODIUM 40 MILLIGRAM(S): 100 INJECTION SUBCUTANEOUS at 19:26

## 2024-01-16 RX ADMIN — LOSARTAN POTASSIUM 50 MILLIGRAM(S): 100 TABLET, FILM COATED ORAL at 19:25

## 2024-01-16 RX ADMIN — SODIUM CHLORIDE 1000 MILLILITER(S): 9 INJECTION INTRAMUSCULAR; INTRAVENOUS; SUBCUTANEOUS at 13:59

## 2024-01-16 RX ADMIN — Medication 15 MILLIGRAM(S): at 19:30

## 2024-01-16 RX ADMIN — ESCITALOPRAM OXALATE 10 MILLIGRAM(S): 10 TABLET, FILM COATED ORAL at 19:25

## 2024-01-16 RX ADMIN — Medication 975 MILLIGRAM(S): at 19:30

## 2024-01-16 RX ADMIN — Medication 75 MILLIGRAM(S): at 13:59

## 2024-01-16 RX ADMIN — AZITHROMYCIN 255 MILLIGRAM(S): 500 TABLET, FILM COATED ORAL at 11:50

## 2024-01-16 RX ADMIN — SODIUM CHLORIDE 1000 MILLILITER(S): 9 INJECTION INTRAMUSCULAR; INTRAVENOUS; SUBCUTANEOUS at 10:06

## 2024-01-16 RX ADMIN — Medication 975 MILLIGRAM(S): at 15:40

## 2024-01-16 RX ADMIN — Medication 104 MILLIGRAM(S): at 09:40

## 2024-01-16 RX ADMIN — Medication 975 MILLIGRAM(S): at 22:43

## 2024-01-16 RX ADMIN — Medication 400 MILLIGRAM(S): at 09:39

## 2024-01-16 NOTE — ED PROVIDER NOTE - PHYSICAL EXAMINATION
CONSTITUTIONAL: Awake, alert.  Seems slightly confused/lethargic.  +urine stained gown.    HEAD: Normocephalic, atraumatic.    EYES: Conjunctivae clear without exudates or hemorrhage. Sclera is non-icteric.  PERRL.    ENT: Normal appearing external ears, nose, mucous membranes moist.  +L tongue bite present.    NECK: supple, trachea midline.  No midline ttp, FROM    HEART:  Normal rate, regular rhythm.  Heart sounds S1, S2.  No murmurs, rubs or gallops.    LUNGS:  No acute respiratory distress.  Non-tachypneic and non-labored.  Lungs are clear bilaterally with good aeration.  No wheezing, rales, rhonchi.    ABDOMEN: Normal appearing skin without lesions, rashes.  Normal bowel sounds x 4.  Soft, non-distended, non-tender in all four quadrants. No rebound or guarding. No hernias or masses palpable.  No pulsatile abdominal mass.   No CVA tenderness b/l.    MUSCULOSKELETAL:  Moving all extremities without issue.    SKIN: Skin in warm, dry and intact without rashes or lesions.  Appropriate color for ethnicity.    NEUROLOGICAL:  Patient is awake, slightly lethargic.  oriented x person, place.  Did not know year. Face symmetric with equal sensation to light touch throughout, PERRL, EOMI, no nystagmus, hearing grossly equal and intact b/l, no tongue deviation, intact strength upon turning head against resistance b/l, No gross motor deficit, 5/5 strength throughout, no gross sensory loss to light touch b/l upper and lower ext, normal movement.  No dysmetria on finger to nose testing.  Neg pronator drift.

## 2024-01-16 NOTE — ED ADULT NURSE NOTE - OBJECTIVE STATEMENT
53yF pmhx HTN BIBEMS s/p witnessed seizure. Pt reports feeling +cough, headache, diarrhea, exhaustion, lethargy since yesterday. This morning pt was returning from using the bathroom when she fell onto the bed and  reports "she was shaking, foaming out of the mouth for a few mins", pt also soiled herself. EMS was called and pt was given 5 mg of versed IM prior to ER arrival. On arrival pt lethargic, complaining of 8/10 headache. Denies CP/SOB, did not take temperature at home.

## 2024-01-16 NOTE — ED ADULT NURSE NOTE - NSFALLRISKINTERV_ED_ALL_ED
Assistance OOB with selected safe patient handling equipment if applicable/Assistance with ambulation/Communicate fall risk and risk factors to all staff, patient, and family/Provide visual cue: yellow wristband, yellow gown, etc/Reinforce activity limits and safety measures with patient and family/Call bell, personal items and telephone in reach/Instruct patient to call for assistance before getting out of bed/chair/stretcher/Non-slip footwear applied when patient is off stretcher/[place] to call system/Physically safe environment - no spills, clutter or unnecessary equipment/Purposeful Proactive Rounding/Room/bathroom lighting operational, light cord in reach Assistance OOB with selected safe patient handling equipment if applicable/Assistance with ambulation/Communicate fall risk and risk factors to all staff, patient, and family/Provide visual cue: yellow wristband, yellow gown, etc/Reinforce activity limits and safety measures with patient and family/Call bell, personal items and telephone in reach/Instruct patient to call for assistance before getting out of bed/chair/stretcher/Non-slip footwear applied when patient is off stretcher/[place] to call system/Physically safe environment - no spills, clutter or unnecessary equipment/Purposeful Proactive Rounding/Room/bathroom lighting operational, light cord in reach

## 2024-01-16 NOTE — H&P ADULT - HISTORY OF PRESENT ILLNESS
HPI:  54yo F PMH HTN, L breast mass presented for witnessed seizure at home. Pt states that yesterday she woke up feeling weak and had fever and cough. Stayed in bed almost all day yesterday. 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 Per her  at bedside pt was sitting up at the side of their bed and stated she did not feel well and she laid back down for 10 minutes. She then sat up, said out loud that she needed to throw up and use the bathroom, and then all of a sudden started shaking.  states her whole body was shaking and she fell back onto the bed. During this episode she was foaming at the mouth, bit her tongue and was incontinent of bowel and bladder. Episode lasted "a few minutes" and stopped on its own. Her  called 911, but pt stopped shaking on her own before EMS arrived, and was confused and "delirious", repeating "help me!". EMS gave her Versed 5 mg IM in the field to calm her down.  After   Drinks alcohol socially, on weekends, usually margaritas. Does not smoke cigarettes. Smokes marijuana recreationally every other day.       MEDICATIONS  Home Medications:  losartan:  (2021 11:25)  NIFEdipine:  (2021 11:25)      Allergies    penicillin (Unknown)  lisinopril (Angioedema)    Intolerances        NEURO:   MENTAL STATUS: AAOx3  LANG/SPEECH: Fluent, intact naming, repetition & comprehension  CRANIAL NERVES:  II: Pupils equal and reactive, no RAPD, normal visual field  III, IV, VI: EOM intact, no gaze preference or deviation  V: normal  VII: no facial asymmetry  VIII: normal hearing to speech  MOTOR: 5/5 in both upper and lower extremities  REFLEXES: 2/4 throughout, bilateral flexor plantars  SENSORY: Normal to touch, temperature & pin prick in all extremities  COORD: Normal finger to nose and heel to shin, no tremor, no dysmetria  Gait: deferred.       LABS:                        13.3   9.53  )-----------( 213      ( 2024 08:52 )             39.5     01-16    136  |  99  |  12  ----------------------------<  85  3.7   |  25  |  0.92    Ca    9.3      2024 08:52  Phos  2.3     -  Mg     2.3         TPro  8.3  /  Alb  4.2  /  TBili  0.4  /  DBili  x   /  AST  29  /  ALT  18  /  AlkPhos  126<H>      Hemoglobin A1C:   Vitamin B12     CAPILLARY BLOOD GLUCOSE      POCT Blood Glucose.: 105 mg/dL (2024 08:32)      Urinalysis Basic - ( 2024 08:52 )    Color: Yellow / Appearance: Clear / S.005 / pH: x  Gluc: 85 mg/dL / Ketone: Negative mg/dL  / Bili: Negative / Urobili: 0.2 mg/dL   Blood: x / Protein: Negative mg/dL / Nitrite: Negative   Leuk Esterase: Trace / RBC: 1 /HPF / WBC 0 /HPF   Sq Epi: x / Non Sq Epi: 0 /HPF / Bacteria: Few /HPF            Microbiology:    Urinalysis with Rflx Culture (collected 2024 08:52)        RADIOLOGY, EKG AND ADDITIONAL TESTS: Reviewed.           HPI:  54yo F PMH HTN, L breast mass presented for witnessed seizure at home. Pt states that yesterday she woke up feeling weak and had fever and cough. Stayed in bed almost all day yesterday. 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 Per her  at bedside pt was sitting up at the side of their bed and stated she did not feel well and she laid back down for 10 minutes. She then sat up, said out loud that she needed to throw up and use the bathroom, and then all of a sudden started shaking.  states her whole body was shaking and she fell back onto the bed. During this episode she was foaming at the mouth, bit her tongue and was incontinent of bowel and bladder. Episode lasted "a few minutes" and stopped on its own. Her  called 911, but pt stopped shaking on her own before EMS arrived, and was confused and "delirious", repeating "help me!". EMS gave her Versed 5 mg IM in the field to calm her down.  After   Drinks alcohol socially, on weekends, usually margaritas. Does not smoke cigarettes. Smokes marijuana recreationally every other day.       MEDICATIONS  Home Medications:  losartan:  (2021 11:25)  NIFEdipine:  (2021 11:25)      Allergies    penicillin (Unknown)  lisinopril (Angioedema)    Intolerances        NEURO:   MENTAL STATUS: AAOx3  LANG/SPEECH: Fluent, intact naming, repetition & comprehension  CRANIAL NERVES:  II: Pupils equal and reactive, no RAPD, normal visual field  III, IV, VI: EOM intact, no gaze preference or deviation  V: normal  VII: no facial asymmetry  VIII: normal hearing to speech  MOTOR: 5/5 in both upper and lower extremities  REFLEXES: 2/4 throughout, bilateral flexor plantars  SENSORY: Normal to touch, temperature & pin prick in all extremities  COORD: Normal finger to nose and heel to shin, no tremor, no dysmetria  Gait: deferred.       LABS:                        13.3   9.53  )-----------( 213      ( 2024 08:52 )             39.5     01-16    136  |  99  |  12  ----------------------------<  85  3.7   |  25  |  0.92    Ca    9.3      2024 08:52  Phos  2.3     -  Mg     2.3         TPro  8.3  /  Alb  4.2  /  TBili  0.4  /  DBili  x   /  AST  29  /  ALT  18  /  AlkPhos  126<H>      Hemoglobin A1C:   Vitamin B12     CAPILLARY BLOOD GLUCOSE      POCT Blood Glucose.: 105 mg/dL (2024 08:32)      Urinalysis Basic - ( 2024 08:52 )    Color: Yellow / Appearance: Clear / S.005 / pH: x  Gluc: 85 mg/dL / Ketone: Negative mg/dL  / Bili: Negative / Urobili: 0.2 mg/dL   Blood: x / Protein: Negative mg/dL / Nitrite: Negative   Leuk Esterase: Trace / RBC: 1 /HPF / WBC 0 /HPF   Sq Epi: x / Non Sq Epi: 0 /HPF / Bacteria: Few /HPF            Microbiology:    Urinalysis with Rflx Culture (collected 2024 08:52)        RADIOLOGY, EKG AND ADDITIONAL TESTS: Reviewed.           HPI:  52yo F PMH HTN, L breast mass (noticed 2 weeks ago still being worked up) presented for witnessed seizure at home. Pt states that yesterday she woke up feeling weak and had fever and cough. Stayed in bed almost all day yesterday. 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 Per her  at bedside pt was sitting up at the side of their bed and stated she did not feel well and she laid back down for 10 minutes. She then sat up, said out loud that she needed to throw up and use the bathroom, and then all of a sudden started shaking.  states her whole body was shaking and she fell back onto the bed. During this episode she was foaming at the mouth, bit her tongue and was incontinent of bowel and bladder. Episode lasted "a few minutes" and stopped on its own. Her  called 911, but pt stopped shaking on her own before EMS arrived, and was confused and "delirious", repeating "help me!". EMS gave her Versed 5 mg IM in the field to calm her down.    Drinks alcohol socially, on weekends, usually margaritas. Does not smoke cigarettes. Smokes marijuana recreationally every other day.      MEDICATIONS  Home Medications:  Lexapro 10 mg qd  Losartan 50 mg qd non-compliant  chlorthalidone 25 mg qd non-compliant  amlodipine 5 mg qd non-compliant      Allergies    penicillin (yeast infection)  lisinopril (Angioedema)    Intolerances        NEURO:   MENTAL STATUS: AAOx3  LANG/SPEECH: Fluent, intact naming, repetition & comprehension  CRANIAL NERVES:  II: Pupils equal and reactive, no RAPD, normal visual field  III, IV, VI: EOM intact, no gaze preference or deviation  V: normal  VII: no facial asymmetry  VIII: normal hearing to speech  MOTOR: 5/5 in both upper and lower extremities  REFLEXES: 2/4 throughout, bilateral flexor plantars  SENSORY: Normal to touch, temperature & pin prick in all extremities  COORD: Normal finger to nose and heel to shin, no tremor, no dysmetria  Gait: deferred.       LABS:                        13.3   9.53  )-----------( 213      ( 2024 08:52 )             39.5         136  |  99  |  12  ----------------------------<  85  3.7   |  25  |  0.92    Ca    9.3      2024 08:52  Phos  2.3       Mg     2.3         TPro  8.3  /  Alb  4.2  /  TBili  0.4  /  DBili  x   /  AST  29  /  ALT  18  /  AlkPhos  126<H>      Hemoglobin A1C:   Vitamin B12     CAPILLARY BLOOD GLUCOSE      POCT Blood Glucose.: 105 mg/dL (2024 08:32)      Urinalysis Basic - ( 2024 08:52 )    Color: Yellow / Appearance: Clear / S.005 / pH: x  Gluc: 85 mg/dL / Ketone: Negative mg/dL  / Bili: Negative / Urobili: 0.2 mg/dL   Blood: x / Protein: Negative mg/dL / Nitrite: Negative   Leuk Esterase: Trace / RBC: 1 /HPF / WBC 0 /HPF   Sq Epi: x / Non Sq Epi: 0 /HPF / Bacteria: Few /HPF            Microbiology:    Urinalysis with Rflx Culture (collected 2024 08:52)        RADIOLOGY, EKG AND ADDITIONAL TESTS: Reviewed.           HPI:  54yo F PMH HTN, L breast mass (noticed 2 weeks ago still being worked up) presented for witnessed seizure at home. Pt states that yesterday she woke up feeling weak and had fever and cough. Stayed in bed almost all day yesterday. This morning she woke up intending to go to work (works as supervisor for [employer]) but had a headache and still felt weak, and had fever and cough. Per her  at bedside pt was sitting up at the side of their bed and stated she did not feel well and she laid back down for 10 minutes. She then sat up, said out loud that she needed to throw up and use the bathroom, and then all of a sudden started shaking.  states her whole body was shaking and she fell back onto the bed. During this episode she was foaming at the mouth, bit her tongue and was incontinent of bowel and bladder. Episode lasted "a few minutes" and stopped on its own. Her  called 911, but pt stopped shaking on her own before EMS arrived, and was confused and "delirious", repeating "help me!". EMS gave her Versed 5 mg IM in the field to calm her down.    Drinks alcohol socially, on weekends, usually margaritas. Does not smoke cigarettes. Smokes marijuana recreationally every other day.      MEDICATIONS  Home Medications:  Lexapro 10 mg qd  Losartan 50 mg qd non-compliant  chlorthalidone 25 mg qd non-compliant  amlodipine 5 mg qd non-compliant      Allergies    penicillin (yeast infection)  lisinopril (Angioedema)    Intolerances        NEURO:   MENTAL STATUS: AAOx3  LANG/SPEECH: Fluent, intact naming, repetition & comprehension  CRANIAL NERVES:  II: Pupils equal and reactive, no RAPD, normal visual field  III, IV, VI: EOM intact, no gaze preference or deviation  V: normal  VII: no facial asymmetry  VIII: normal hearing to speech  MOTOR: 5/5 in both upper and lower extremities  REFLEXES: 2/4 throughout, bilateral flexor plantars  SENSORY: Normal to touch, temperature & pin prick in all extremities  COORD: Normal finger to nose and heel to shin, no tremor, no dysmetria  Gait: deferred.       LABS:                        13.3   9.53  )-----------( 213      ( 2024 08:52 )             39.5         136  |  99  |  12  ----------------------------<  85  3.7   |  25  |  0.92    Ca    9.3      2024 08:52  Phos  2.3       Mg     2.3         TPro  8.3  /  Alb  4.2  /  TBili  0.4  /  DBili  x   /  AST  29  /  ALT  18  /  AlkPhos  126<H>      Hemoglobin A1C:   Vitamin B12     CAPILLARY BLOOD GLUCOSE      POCT Blood Glucose.: 105 mg/dL (2024 08:32)      Urinalysis Basic - ( 2024 08:52 )    Color: Yellow / Appearance: Clear / S.005 / pH: x  Gluc: 85 mg/dL / Ketone: Negative mg/dL  / Bili: Negative / Urobili: 0.2 mg/dL   Blood: x / Protein: Negative mg/dL / Nitrite: Negative   Leuk Esterase: Trace / RBC: 1 /HPF / WBC 0 /HPF   Sq Epi: x / Non Sq Epi: 0 /HPF / Bacteria: Few /HPF            Microbiology:    Urinalysis with Rflx Culture (collected 2024 08:52)        RADIOLOGY, EKG AND ADDITIONAL TESTS: Reviewed.           HPI:  52yo F PMH HTN, L breast mass (noticed 2 weeks ago still being worked up) presented for witnessed seizure at home. Pt states that yesterday she woke up feeling weak and had fever and cough. Stayed in bed almost all day yesterday. 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 Per her  at bedside pt was sitting up at the side of their bed and stated she did not feel well and she laid back down for 10 minutes. She then sat up, said out loud that she needed to throw up and use the bathroom, and then all of a sudden started shaking.  states her whole body was shaking and she fell back onto the bed. During this episode she was foaming at the mouth, bit her tongue and was incontinent of bowel and bladder. Episode lasted "a few minutes" and stopped on its own. Her  called 911, but pt stopped shaking on her own before EMS arrived, and post-ictally was confused not recognizing her  appearing to be  "delirious", repeating "help me!". EMS gave her Versed 5 mg IM in the field to calm her down.    Drinks alcohol socially, on weekends, usually margaritas. Does not smoke cigarettes. Smokes marijuana recreationally every other day.      MEDICATIONS  Home Medications:  Lexapro 10 mg qd  Losartan 50 mg qd non-compliant  chlorthalidone 25 mg qd non-compliant  amlodipine 5 mg qd non-compliant      Allergies    penicillin (yeast infection)  lisinopril (Angioedema)    Intolerances        NEURO:   MENTAL STATUS: AAOx3  LANG/SPEECH: Fluent, intact naming, repetition & comprehension  CRANIAL NERVES:  II: Pupils equal and reactive, no RAPD, normal visual field  III, IV, VI: EOM intact, no gaze preference or deviation  V: normal  VII: no facial asymmetry  VIII: normal hearing to speech  MOTOR: 5/5 in both upper and lower extremities  REFLEXES: 2/4 throughout, bilateral flexor plantars  SENSORY: Normal to touch, temperature & pin prick in all extremities  COORD: Normal finger to nose and heel to shin, no tremor, no dysmetria  Gait: deferred.       LABS:                        13.3   9.53  )-----------( 213      ( 2024 08:52 )             39.5         136  |  99  |  12  ----------------------------<  85  3.7   |  25  |  0.92    Ca    9.3      2024 08:52  Phos  2.3       Mg     2.3         TPro  8.3  /  Alb  4.2  /  TBili  0.4  /  DBili  x   /  AST  29  /  ALT  18  /  AlkPhos  126<H>      Hemoglobin A1C:   Vitamin B12     CAPILLARY BLOOD GLUCOSE      POCT Blood Glucose.: 105 mg/dL (2024 08:32)      Urinalysis Basic - ( 2024 08:52 )    Color: Yellow / Appearance: Clear / S.005 / pH: x  Gluc: 85 mg/dL / Ketone: Negative mg/dL  / Bili: Negative / Urobili: 0.2 mg/dL   Blood: x / Protein: Negative mg/dL / Nitrite: Negative   Leuk Esterase: Trace / RBC: 1 /HPF / WBC 0 /HPF   Sq Epi: x / Non Sq Epi: 0 /HPF / Bacteria: Few /HPF            Microbiology:    Urinalysis with Rflx Culture (collected 2024 08:52)        RADIOLOGY, EKG AND ADDITIONAL TESTS: Reviewed.           HPI:  52yo F PMH HTN, L breast mass (noticed 2 weeks ago still being worked up) presented for witnessed seizure at home. Pt states that yesterday she woke up feeling weak and had fever and cough. Stayed in bed almost all day yesterday. 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 Per her  at bedside pt was sitting up at the side of their bed and stated she did not feel well and she laid back down for 10 minutes. She then sat up, said out loud that she needed to throw up and use the bathroom, and then all of a sudden started shaking.  states her whole body was shaking and she fell back onto the bed. During this episode she was foaming at the mouth, bit her tongue and was incontinent of bowel and bladder. Episode lasted "a few minutes" and stopped on its own. Her  called 911, but pt stopped shaking on her own before EMS arrived, and post-ictally was confused not recognizing her  appearing to be  "delirious", repeating "help me!". EMS gave her Versed 5 mg IM in the field to calm her down.    Drinks alcohol socially, on weekends, usually margaritas. Does not smoke cigarettes. Smokes marijuana recreationally every other day.      MEDICATIONS  Home Medications:  Lexapro 10 mg qd  Losartan 50 mg qd non-compliant  chlorthalidone 25 mg qd non-compliant  amlodipine 5 mg qd non-compliant      Allergies    penicillin (yeast infection)  lisinopril (Angioedema)    Intolerances        NEURO:   MENTAL STATUS: AAOx3  LANG/SPEECH: Fluent, intact naming, repetition & comprehension  CRANIAL NERVES:  II: Pupils equal and reactive, no RAPD, normal visual field  III, IV, VI: EOM intact, no gaze preference or deviation  V: normal  VII: no facial asymmetry  VIII: normal hearing to speech  MOTOR: 5/5 in both upper and lower extremities  REFLEXES: 2/4 throughout, bilateral flexor plantars  SENSORY: Normal to touch, temperature & pin prick in all extremities  COORD: Normal finger to nose and heel to shin, no tremor, no dysmetria  Gait: deferred.       LABS:                        13.3   9.53  )-----------( 213      ( 2024 08:52 )             39.5         136  |  99  |  12  ----------------------------<  85  3.7   |  25  |  0.92    Ca    9.3      2024 08:52  Phos  2.3       Mg     2.3         TPro  8.3  /  Alb  4.2  /  TBili  0.4  /  DBili  x   /  AST  29  /  ALT  18  /  AlkPhos  126<H>      Hemoglobin A1C:   Vitamin B12     CAPILLARY BLOOD GLUCOSE      POCT Blood Glucose.: 105 mg/dL (2024 08:32)      Urinalysis Basic - ( 2024 08:52 )    Color: Yellow / Appearance: Clear / S.005 / pH: x  Gluc: 85 mg/dL / Ketone: Negative mg/dL  / Bili: Negative / Urobili: 0.2 mg/dL   Blood: x / Protein: Negative mg/dL / Nitrite: Negative   Leuk Esterase: Trace / RBC: 1 /HPF / WBC 0 /HPF   Sq Epi: x / Non Sq Epi: 0 /HPF / Bacteria: Few /HPF            Microbiology:    Urinalysis with Rflx Culture (collected 2024 08:52)        RADIOLOGY, EKG AND ADDITIONAL TESTS: Reviewed.           HPI:  54yo F PMH HTN, L breast mass (noticed 2 weeks ago still being worked up) presented for witnessed seizure at home. Pt states that yesterday she woke up feeling weak and had fever and cough. Stayed in bed almost all day yesterday. 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 Per her  at bedside pt was sitting up at the side of their bed and stated she did not feel well and she laid back down for 10 minutes. She then sat up, said out loud that she needed to throw up and use the bathroom, and then all of a sudden started shaking.  states her whole body was shaking and she fell back onto the bed. During this episode she was foaming at the mouth, bit her tongue and was incontinent of bowel and bladder. Episode lasted "a few minutes" and stopped on its own. Her  called 911, but pt stopped shaking on her own before EMS arrived, and post-ictally was confused not recognizing her  appearing to be  "delirious", repeating "help me!". EMS gave her Versed 5 mg IM in the field to calm her down.    Drinks alcohol socially, on weekends, usually margaritas. Does not smoke cigarettes. Smokes marijuana recreationally every other day.      MEDICATIONS  Home Medications:  Lexapro 10 mg qd  Losartan 50 mg qd non-compliant  chlorthalidone 25 mg qd non-compliant  amlodipine 5 mg qd non-compliant      Allergies    penicillin (yeast infection)  lisinopril (Angioedema)    Intolerances        NEURO:   MENTAL STATUS: AAOx3  LANG/SPEECH: Fluent, intact naming, repetition & comprehension  CRANIAL NERVES:  II: Pupils equal and reactive, no RAPD, normal visual field  III, IV, VI: EOM intact, no gaze preference or deviation  V: normal  VII: no facial asymmetry  VIII: normal hearing to speech  MOTOR: 5/5 in both upper and lower extremities  REFLEXES: 2/4 throughout, bilateral flexor plantars  SENSORY: Normal to touch, temperature & pin prick in all extremities  COORD: Normal finger to nose and heel to shin, no tremor, no dysmetria  Gait: deferred.       LABS:                        13.3   9.53  )-----------( 213      ( 2024 08:52 )             39.5         136  |  99  |  12  ----------------------------<  85  3.7   |  25  |  0.92    Ca    9.3      2024 08:52  Phos  2.3       Mg     2.3         TPro  8.3  /  Alb  4.2  /  TBili  0.4  /  DBili  x   /  AST  29  /  ALT  18  /  AlkPhos  126<H>      Hemoglobin A1C:   Vitamin B12     CAPILLARY BLOOD GLUCOSE      POCT Blood Glucose.: 105 mg/dL (2024 08:32)      Urinalysis Basic - ( 2024 08:52 )    Color: Yellow / Appearance: Clear / S.005 / pH: x  Gluc: 85 mg/dL / Ketone: Negative mg/dL  / Bili: Negative / Urobili: 0.2 mg/dL   Blood: x / Protein: Negative mg/dL / Nitrite: Negative   Leuk Esterase: Trace / RBC: 1 /HPF / WBC 0 /HPF   Sq Epi: x / Non Sq Epi: 0 /HPF / Bacteria: Few /HPF            Microbiology:    Urinalysis with Rflx Culture (collected 2024 08:52)    Influenza AH3 (RapRVP): Detected (24 @ 08:52)          RADIOLOGY, EKG AND ADDITIONAL TESTS: Reviewed.           HPI:  54yo F PMH HTN, L breast mass (noticed 2 weeks ago still being worked up) presented for witnessed seizure at home. Pt states that yesterday she woke up feeling weak and had fever and cough. Stayed in bed almost all day yesterday. 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 Per her  at bedside pt was sitting up at the side of their bed and stated she did not feel well and she laid back down for 10 minutes. She then sat up, said out loud that she needed to throw up and use the bathroom, and then all of a sudden started shaking.  states her whole body was shaking and she fell back onto the bed. During this episode she was foaming at the mouth, bit her tongue and was incontinent of bowel and bladder. Episode lasted "a few minutes" and stopped on its own. Her  called 911, but pt stopped shaking on her own before EMS arrived, and post-ictally was confused not recognizing her  appearing to be  "delirious", repeating "help me!". EMS gave her Versed 5 mg IM in the field to calm her down.    Drinks alcohol socially, on weekends, usually margaritas. Does not smoke cigarettes. Smokes marijuana recreationally every other day.      MEDICATIONS  Home Medications:  Lexapro 10 mg qd  Losartan 50 mg qd non-compliant  chlorthalidone 25 mg qd non-compliant  amlodipine 5 mg qd non-compliant      Allergies    penicillin (yeast infection)  lisinopril (Angioedema)    Intolerances        NEURO:   MENTAL STATUS: AAOx3  LANG/SPEECH: Fluent, intact naming, repetition & comprehension  CRANIAL NERVES:  II: Pupils equal and reactive, no RAPD, normal visual field  III, IV, VI: EOM intact, no gaze preference or deviation  V: normal  VII: no facial asymmetry  VIII: normal hearing to speech  MOTOR: 5/5 in both upper and lower extremities  REFLEXES: 2/4 throughout, bilateral flexor plantars  SENSORY: Normal to touch, temperature & pin prick in all extremities  COORD: Normal finger to nose and heel to shin, no tremor, no dysmetria  Gait: deferred.       LABS:                        13.3   9.53  )-----------( 213      ( 2024 08:52 )             39.5         136  |  99  |  12  ----------------------------<  85  3.7   |  25  |  0.92    Ca    9.3      2024 08:52  Phos  2.3       Mg     2.3         TPro  8.3  /  Alb  4.2  /  TBili  0.4  /  DBili  x   /  AST  29  /  ALT  18  /  AlkPhos  126<H>      Hemoglobin A1C:   Vitamin B12     CAPILLARY BLOOD GLUCOSE      POCT Blood Glucose.: 105 mg/dL (2024 08:32)      Urinalysis Basic - ( 2024 08:52 )    Color: Yellow / Appearance: Clear / S.005 / pH: x  Gluc: 85 mg/dL / Ketone: Negative mg/dL  / Bili: Negative / Urobili: 0.2 mg/dL   Blood: x / Protein: Negative mg/dL / Nitrite: Negative   Leuk Esterase: Trace / RBC: 1 /HPF / WBC 0 /HPF   Sq Epi: x / Non Sq Epi: 0 /HPF / Bacteria: Few /HPF            Microbiology:    Urinalysis with Rflx Culture (collected 2024 08:52)    Influenza AH3 (RapRVP): Detected (24 @ 08:52)          RADIOLOGY, EKG AND ADDITIONAL TESTS: Reviewed.

## 2024-01-16 NOTE — ED PROVIDER NOTE - NSICDXPASTSURGICALHX_GEN_ALL_CORE_FT
PAST SURGICAL HISTORY:  History of  x 2    History of shoulder surgery Right Rotator cuff repair    History of tubal ligation

## 2024-01-16 NOTE — ED ADULT TRIAGE NOTE - CHIEF COMPLAINT QUOTE
Pt BIB EMS for seizure witnessed by  lasting 2-3 min. Pt bit tongue and defecated on self during episode. PT given 5 IM Versed with EMS. No hx of seizures. Pt postictal in triage.

## 2024-01-16 NOTE — ED PROVIDER NOTE - ATTENDING APP SHARED VISIT CONTRIBUTION OF CARE
53 HTN now with fever cough since yesterday in addition to generalized headache, generalized weakness, then today awoke from sleep and returning to bed from bathroom had witnessed tonic clonic seizure type movements, foaming at mouth, tongue biting and incontinent of urine.   at bedside recounting event.  Denies neck stiffness or photophobia.  Received diazepam 10mg in field.  Mild confusion AOx2, nonfocal exam, tongue abrasion.  No meningeal signs.  Clear lungs, mild tachycardia.  Belly soft, no head trauma.  Hypoxia on arrival.  DDx includes pna (cap), URI - viral, causing seizure due to fever or acute illness, and/or brain mass.  Doubt PE, syncope with convulsion or meningitis at this time.  Will give fluids, antipyretics, emperic abx (possible lung source), epilepsy consult, labs, ct head, cxr.

## 2024-01-16 NOTE — ED PROVIDER NOTE - OBJECTIVE STATEMENT
52 y/o female w/ hx htn p/w seizure this morning.  Pt notes started feeling sick yesterday- coughing, felt feverish, weak, and c/o ha.  Spent day in bed yesterday.  Vomited 1x after coughing.  +loose stool yesterday.  States today woke up, went to bathroom, then when came back to bedroom,  states pt fell into bed and began shaking, with foam/blood at mouth, and urinated/ defecated on self.  Episode lasted a few min, then pt seemed confused immediately after.  Upon EMS arrival, pt given 5 IM versed (approx 30-45 min prior to arrival to ED).  At time of eval, pt states feels slightly 'out of it', has generalized ha, and feels nauseous.  Denies head trauma/strike.  Denies hx seizures or hx similar.  Denies blurry vision, neck stiffness/pain, back pain, body aches, cp, sob, abd pain, diarrhea, dysuria, hematuria, focal numbness/tingling/weakness to ext.  Of note, pt currently being w/u for L breast mass.  Had mammo and US approx 1.5 wk ago, does not yet have results. 54 y/o female w/ hx htn p/w seizure this morning.  Pt notes started feeling sick yesterday- coughing, felt feverish, weak, and c/o ha.  Spent day in bed yesterday.  Vomited 1x after coughing.  +loose stool yesterday.  States today woke up, went to bathroom, then when came back to bedroom,  states pt fell into bed and began shaking, with foam/blood at mouth, and urinated/ defecated on self.  Episode lasted a few min, then pt seemed confused immediately after.  Upon EMS arrival, pt given 5 IM versed (approx 30-45 min prior to arrival to ED).  At time of eval, pt states feels slightly 'out of it', has generalized ha, and feels nauseous.  Denies head trauma/strike.  Denies hx seizures or hx similar.  Denies blurry vision, neck stiffness/pain, back pain, body aches, cp, sob, abd pain, diarrhea, dysuria, hematuria, focal numbness/tingling/weakness to ext.  Of note, pt currently being w/u for L breast mass.  Had mammo and US approx 1.5 wk ago, does not yet have results.

## 2024-01-16 NOTE — ED PROVIDER NOTE - NS ED ROS FT
CONSTITUTIONAL: +fever    HEENT: +cough    RESPIRATORY: Denies SOB     CARDIOVASCULAR: Denies chest pain.    GASTROINTESTINAL: +nausea.  +1 ep vomiting.  Denies abdominal pain.    GENITOURINARY: Denies dysuria and hematuria.    NEUROLOGICAL: See HPI

## 2024-01-16 NOTE — ED PROVIDER NOTE - CLINICAL SUMMARY MEDICAL DECISION MAKING FREE TEXT BOX
52 y/o female w/ hx htn p/w seizure this morning.  Pt notes started feeling sick yesterday- coughing, felt feverish, weak, and c/o ha.  Spent day in bed yesterday.  Vomited 1x after coughing.  +loose stool yesterday.  States today woke up, went to bathroom, then when came back to bedroom,  states pt fell into bed and began shaking, with foam/blood at mouth, and urinated/ defecated on self.  Episode lasted a few min, then pt seemed confused immediately after.  Upon EMS arrival, pt given 5 IM versed (approx 30-45 min prior to arrival to ED).  At time of eval, pt states feels slightly 'out of it', has generalized ha, and feels nauseous.  Denies head trauma/strike.  Denies hx seizures or hx similar.  Denies blurry vision, neck stiffness/pain, back pain, body aches, cp, sob, abd pain, diarrhea, dysuria, hematuria, focal numbness/tingling/weakness to ext.  Of note, pt currently being w/u for L breast mass.  Had mammo and US approx 1.5 wk ago, does not yet have results.  VS notable for , T 99.3 (100.7 rectal), O2 sat 90% RA  /83  Pt seems post-ictal on exam, +tongue bite and urine stain on gown  Pt awake, knows name/location.  Does not know year  New onset seizure, seems likely in setting of infection - suspect likely flu/viral illness, could be pna/aspiration pna with hypoxia, consider meningitis, but less likely given cough/ upper resp symptoms  Will get labs, cxr, ua, cth  IVF, antipyretic, pain meds, abx to cover for poss cap  Epilepsy consult, close monitoring, low threshold for anti-epileptic

## 2024-01-16 NOTE — H&P ADULT - ATTENDING COMMENTS
Pt with probable provoked seizure in the context of H3A influenza Pt with probable seizure in the context of influenza AH3 (+piedad rapid) with reliable historian  as witness.  Possibly provoked, though afebrile currently.    Alternatively could be a mild provocation with another potential cause of seizure.   Pt able to awaken to answer questions appropriately and deny nuchal rigidity, but quickly falls asleep again.    Requires admission - could be under medicine, but neurology willing to take on with medicine co-management.      Plan: vEEG and Considering tamiflu.  If pt at baseline, MRI can be done as outpt as well.

## 2024-01-16 NOTE — H&P ADULT - ASSESSMENT
54yo F PMH HTN BIBEMS after witnessed seizure at home,. No personal or family history of seizure or seizure disorder. Neurology consulted for first-time seizure. Low suspicion for Meningitis given no meningeal signs and return back to baseline mental status.     #New onset GTC seizure 2/2 infection vs tumor   - vEEG to monitor for repeat events  - No LP at this time given resolution of confusion/cognitive impairment and return to baseline  - Brain MRI w/ and w/out contrast    #Influenza  - Tylenol 975 mg q6h PRN for fevers  - Tamiflu 75 mg q12h x5 day course (1/16-1/20)    Diet: Regular  DVT ppx: Lovenox     52yo F PMH HTN BIBEMS after witnessed seizure at home,. No personal or family history of seizure or seizure disorder. Neurology consulted for first-time seizure. Low suspicion for Meningitis given no meningeal signs and return back to baseline mental status.     #New onset GTC seizure 2/2 infection vs tumor   - vEEG to monitor for repeat events  - No LP at this time given resolution of confusion/cognitive impairment and return to baseline  - Brain MRI w/ and w/out contrast    #Influenza  - Tylenol 975 mg q6h PRN for fevers  - Tamiflu 75 mg q12h x5 day course (1/16-1/20)    Diet: Regular  DVT ppx: Lovenox     52yo F PMH HTN BIBEMS after witnessed seizure at home,. No personal or family history of seizure or seizure disorder. Neurology consulted for first-time seizure. Low suspicion for Meningitis given no meningeal signs and return back to baseline mental status.     #New onset GTC seizure 2/2 infection vs tumor   - vEEG to monitor for repeat events  - No LP at this time given resolution of confusion/cognitive impairment and return to baseline  - Brain MRI w/ and w/out contrast    #Influenza  - Tylenol 975 mg q6h PRN for fevers  - Tamiflu 75 mg q12h x5 day course (1/16-1/20)    #HTN, chronic  - continue losartan 50 mg qd    Diet: Regular  DVT ppx: Lovenox     54yo F PMH HTN BIBEMS after witnessed seizure at home,. No personal or family history of seizure or seizure disorder. Neurology consulted for first-time seizure. Low suspicion for Meningitis given no meningeal signs and return back to baseline mental status.     #New onset GTC seizure 2/2 infection vs tumor   - vEEG to monitor for repeat events  - No LP at this time given resolution of confusion/cognitive impairment and return to baseline  - Brain MRI w/ and w/out contrast    #Influenza  - Tylenol 975 mg q6h PRN for fevers  - Tamiflu 75 mg q12h x5 day course (1/16-1/20)    #HTN, chronic  - continue losartan 50 mg qd    Diet: Regular  DVT ppx: Lovenox     54yo F PMH HTN BIBEMS after witnessed seizure at home,. No personal or family history of seizure or seizure disorder. Neurology consulted for first-time seizure. Low suspicion for Meningitis given no meningeal signs and return back to baseline mental status.  Though still sleepy.    #New onset GTC seizure 2/2 infection vs tumor   - vEEG to monitor for risk of recurrence  - No LP at this time given resolution of confusion/cognitive impairment and return to baseline  - Brain MRI w/ and w/out contrast    #Influenza  - Tylenol 975 mg q6h PRN for fevers  - Tamiflu 75 mg q12h x5 day course (1/16-1/20)    #HTN, chronic  - continue losartan 50 mg qd    Diet: Regular  DVT ppx: Lovenox

## 2024-01-17 LAB
ALBUMIN SERPL ELPH-MCNC: 3.7 G/DL — SIGNIFICANT CHANGE UP (ref 3.3–5)
ALP SERPL-CCNC: 109 U/L — SIGNIFICANT CHANGE UP (ref 40–120)
ALT FLD-CCNC: 26 U/L — SIGNIFICANT CHANGE UP (ref 10–45)
ANION GAP SERPL CALC-SCNC: 14 MMOL/L — SIGNIFICANT CHANGE UP (ref 5–17)
AST SERPL-CCNC: 69 U/L — HIGH (ref 10–40)
BILIRUB SERPL-MCNC: 0.4 MG/DL — SIGNIFICANT CHANGE UP (ref 0.2–1.2)
BUN SERPL-MCNC: 11 MG/DL — SIGNIFICANT CHANGE UP (ref 7–23)
CALCIUM SERPL-MCNC: 8.6 MG/DL — SIGNIFICANT CHANGE UP (ref 8.4–10.5)
CHLORIDE SERPL-SCNC: 102 MMOL/L — SIGNIFICANT CHANGE UP (ref 96–108)
CO2 SERPL-SCNC: 21 MMOL/L — LOW (ref 22–31)
CREAT SERPL-MCNC: 0.91 MG/DL — SIGNIFICANT CHANGE UP (ref 0.5–1.3)
EGFR: 74 ML/MIN/1.73M2 — SIGNIFICANT CHANGE UP
GLUCOSE SERPL-MCNC: 100 MG/DL — HIGH (ref 70–99)
HCT VFR BLD CALC: 37.7 % — SIGNIFICANT CHANGE UP (ref 34.5–45)
HGB BLD-MCNC: 12.9 G/DL — SIGNIFICANT CHANGE UP (ref 11.5–15.5)
MCHC RBC-ENTMCNC: 29.3 PG — SIGNIFICANT CHANGE UP (ref 27–34)
MCHC RBC-ENTMCNC: 34.2 GM/DL — SIGNIFICANT CHANGE UP (ref 32–36)
MCV RBC AUTO: 85.7 FL — SIGNIFICANT CHANGE UP (ref 80–100)
NRBC # BLD: 0 /100 WBCS — SIGNIFICANT CHANGE UP (ref 0–0)
PLATELET # BLD AUTO: 219 K/UL — SIGNIFICANT CHANGE UP (ref 150–400)
POTASSIUM SERPL-MCNC: 3.1 MMOL/L — LOW (ref 3.5–5.3)
POTASSIUM SERPL-SCNC: 3.1 MMOL/L — LOW (ref 3.5–5.3)
PROT SERPL-MCNC: 7.6 G/DL — SIGNIFICANT CHANGE UP (ref 6–8.3)
RBC # BLD: 4.4 M/UL — SIGNIFICANT CHANGE UP (ref 3.8–5.2)
RBC # FLD: 13 % — SIGNIFICANT CHANGE UP (ref 10.3–14.5)
SODIUM SERPL-SCNC: 137 MMOL/L — SIGNIFICANT CHANGE UP (ref 135–145)
WBC # BLD: 5.31 K/UL — SIGNIFICANT CHANGE UP (ref 3.8–10.5)
WBC # FLD AUTO: 5.31 K/UL — SIGNIFICANT CHANGE UP (ref 3.8–10.5)

## 2024-01-17 PROCEDURE — 95718 EEG PHYS/QHP 2-12 HR W/VEEG: CPT

## 2024-01-17 PROCEDURE — 99222 1ST HOSP IP/OBS MODERATE 55: CPT

## 2024-01-17 PROCEDURE — 99233 SBSQ HOSP IP/OBS HIGH 50: CPT

## 2024-01-17 RX ORDER — ENOXAPARIN SODIUM 100 MG/ML
40 INJECTION SUBCUTANEOUS EVERY 12 HOURS
Refills: 0 | Status: DISCONTINUED | OUTPATIENT
Start: 2024-01-17 | End: 2024-01-18

## 2024-01-17 RX ORDER — INFLUENZA VIRUS VACCINE 15; 15; 15; 15 UG/.5ML; UG/.5ML; UG/.5ML; UG/.5ML
0.5 SUSPENSION INTRAMUSCULAR ONCE
Refills: 0 | Status: DISCONTINUED | OUTPATIENT
Start: 2024-01-17 | End: 2024-01-18

## 2024-01-17 RX ORDER — POTASSIUM CHLORIDE 20 MEQ
40 PACKET (EA) ORAL ONCE
Refills: 0 | Status: COMPLETED | OUTPATIENT
Start: 2024-01-17 | End: 2024-01-17

## 2024-01-17 RX ORDER — LANOLIN ALCOHOL/MO/W.PET/CERES
5 CREAM (GRAM) TOPICAL AT BEDTIME
Refills: 0 | Status: DISCONTINUED | OUTPATIENT
Start: 2024-01-17 | End: 2024-01-18

## 2024-01-17 RX ADMIN — ENOXAPARIN SODIUM 40 MILLIGRAM(S): 100 INJECTION SUBCUTANEOUS at 17:26

## 2024-01-17 RX ADMIN — Medication 75 MILLIGRAM(S): at 06:15

## 2024-01-17 RX ADMIN — LOSARTAN POTASSIUM 50 MILLIGRAM(S): 100 TABLET, FILM COATED ORAL at 06:15

## 2024-01-17 RX ADMIN — ESCITALOPRAM OXALATE 10 MILLIGRAM(S): 10 TABLET, FILM COATED ORAL at 12:05

## 2024-01-17 RX ADMIN — Medication 975 MILLIGRAM(S): at 09:55

## 2024-01-17 RX ADMIN — Medication 75 MILLIGRAM(S): at 17:25

## 2024-01-17 RX ADMIN — Medication 975 MILLIGRAM(S): at 08:55

## 2024-01-17 RX ADMIN — Medication 40 MILLIEQUIVALENT(S): at 15:28

## 2024-01-17 RX ADMIN — Medication 5 MILLIGRAM(S): at 21:14

## 2024-01-17 NOTE — PATIENT PROFILE ADULT - FALL HARM RISK - RISK INTERVENTIONS

## 2024-01-17 NOTE — CONSULT NOTE ADULT - SUBJECTIVE AND OBJECTIVE BOX
See below for neuro HPI  "52yo F PMH HTN, L breast mass (noticed 2 weeks ago still being worked up) presented for witnessed seizure at home. Pt states that yesterday she woke up feeling weak and had fever and cough. Stayed in bed almost all day yesterday. 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 Per her  at bedside pt was sitting up at the side of their bed and stated she did not feel well and she laid back down for 10 minutes. She then sat up, said out loud that she needed to throw up and use the bathroom, and then all of a sudden started shaking.  states her whole body was shaking and she fell back onto the bed. During this episode she was foaming at the mouth, bit her tongue and was incontinent of bowel and bladder. Episode lasted "a few minutes" and stopped on its own. Her  called 911, but pt stopped shaking on her own before EMS arrived, and post-ictally was confused not recognizing her  appearing to be  "delirious", repeating "help me!". EMS gave her Versed 5 mg IM in the field to calm her down.    Drinks alcohol socially, on weekends, usually margaritas. Does not smoke cigarettes. Smokes marijuana recreationally every other day.      MEDICATIONS  Home Medications:  Lexapro 10 mg qd  Losartan 50 mg qd non-compliant  chlorthalidone 25 mg qd non-compliant  amlodipine 5 mg qd non-compliant      Allergies    penicillin (yeast infection)  lisinopril (Angioedema)    Intolerances    Vital Signs Last 24 Hrs  T(C): 37.1 (17 Jan 2024 12:19), Max: 37.8 (16 Jan 2024 21:33)  T(F): 98.8 (17 Jan 2024 12:19), Max: 100 (16 Jan 2024 21:33)  HR: 79 (17 Jan 2024 12:19) (79 - 105)  BP: 130/93 (17 Jan 2024 12:19) (113/67 - 160/107)  BP(mean): --  RR: 16 (17 Jan 2024 12:19) (16 - 18)  SpO2: 97% (17 Jan 2024 12:19) (93% - 97%)    Parameters below as of 17 Jan 2024 12:19  Patient On (Oxygen Delivery Method): room air    Physical exam  General: no acute distress, sitting up in bed, wearing EEG leads  HEENT: ncat, mmm  cards: rrr, normal S1S2, no mrg  pulm: ctab, no wheeze, crackles, rales or rhonchi  ab: soft, nontender, nondistended, normoactive bowel sounds  ext: wwp, no edema  skin: warm and dry, no obvious rashes or lesions

## 2024-01-17 NOTE — CONSULT NOTE ADULT - ASSESSMENT
53F with PMH of HTN and L breast mass being worked up, presenting with new onset seizures and admitted for further workup.     #Seizures  - plan per primary team    #HTN  - continue home BP regimen    #Influenza  - continue with tamiflu  - robitussin for cough, and other supportive care    #Breast mass  - outpatient follow up    Moderate level of medical decision making including the presence of two chronic medical issues and discussion of plan with the neurology team.

## 2024-01-17 NOTE — PROGRESS NOTE ADULT - ATTENDING COMMENTS
I was physically present for the key portions of the evaluation and managemnent (E/M) service provided.  I agree with the above history, physical, and plan which I have reviewed and edited where appropriate, with the exceptions as per my note.    pt with flu, 1st time seizure. back to baseline - normal neuro exam.    unclear etiology.    EEG with focal epileptiform activity.    awaiting brain mri

## 2024-01-17 NOTE — EEG REPORT - NS EEG TEXT BOX
[facility] Department of Neurology  Inpatient Continuous video-Electroencephalogram  [address], [address] [place] [id number], T: [phone]    Patient Name:	PRINCESS ERWIN    :	1970  MRN:	[id number]    Study Start Date/Time:	2024, 12:23:23 AM  Study End Date/Time: ongoing    Referred by:  Zoltan Wu MD    Brief Clinical History:  PRINCESS HOOD is a 53 year old Female admitted for seizure and +influenza; study performed to investigate for seizures or markers of epilepsy.   Diagnosis Code:  R56.9 convulsions/seizure    Pertinent Medication:  none    Acquisition Details:  Electroencephalography was acquired using a minimum of 21 channels on an [employer] system v 9.3.1 with electrode placement according to the standard International 10-20 system following ACNS (American Clinical Neurophysiology Society) guidelines.  Anterior temporal T1 and T2 electrodes were utilized whenever possible.  The [employer] automated spike & seizure detections were all reviewed in detail, in addition to the entire raw EEG.    Findings:  Day 1:  2024, 12:23:23 AM to same day at 07:00 AM   Background:  continuous, with predominantly alpha and beta frequencies.  Generalized Slowing:  None  Symmetry/Focality: Occasional (1-9%) irregular theta/delta over left temporal region  Voltage:  Normal (20+ uV)  Organization:  Appropriate anterior-posterior gradient  Posterior Dominant Rhythm:  9 Hz symmetric, well-organized, and well-modulated  Sleep:  Symmetric, synchronous spindles and K complexes.  Variability:   Yes		Reactivity:  Yes    Spontaneous Activity:  No epileptiform discharges     Events:  1)	No electrographic seizures or significant clinical events occurred during this study.  Provocations:  •	Hyperventilation: was not performed.  •	Photic stimulation: was not performed.  Daily Summary:    1)	Mild focal slowing over left temporal region suggestive of underlying cerebral dysfunction        Babs Lucas MD  Attending Neurologist, [facility]

## 2024-01-17 NOTE — PATIENT PROFILE ADULT - FUNCTIONAL ASSESSMENT - DAILY ACTIVITY 1.
1-regarding the back pain    -improved BUT still be cautious about progress  -I like the reintroduction to resistance with someone keeping eyes on your flare     -I like continuing with advil for inflammation  -save the zanaf for flare up along with Liat but continute to push forward    -I expect if no neuropathy flare up and we should be able to introduce to work by august 1st   
4 = No assist / stand by assistance

## 2024-01-18 ENCOUNTER — TRANSCRIPTION ENCOUNTER (OUTPATIENT)
Age: 54
End: 2024-01-18

## 2024-01-18 VITALS
HEART RATE: 76 BPM | OXYGEN SATURATION: 99 % | DIASTOLIC BLOOD PRESSURE: 86 MMHG | SYSTOLIC BLOOD PRESSURE: 146 MMHG | RESPIRATION RATE: 17 BRPM | TEMPERATURE: 99 F

## 2024-01-18 LAB
ALBUMIN SERPL ELPH-MCNC: 3.8 G/DL — SIGNIFICANT CHANGE UP (ref 3.3–5)
ALP SERPL-CCNC: 109 U/L — SIGNIFICANT CHANGE UP (ref 40–120)
ALT FLD-CCNC: 34 U/L — SIGNIFICANT CHANGE UP (ref 10–45)
ANION GAP SERPL CALC-SCNC: 13 MMOL/L — SIGNIFICANT CHANGE UP (ref 5–17)
AST SERPL-CCNC: 99 U/L — HIGH (ref 10–40)
BILIRUB SERPL-MCNC: 0.4 MG/DL — SIGNIFICANT CHANGE UP (ref 0.2–1.2)
BUN SERPL-MCNC: 10 MG/DL — SIGNIFICANT CHANGE UP (ref 7–23)
CALCIUM SERPL-MCNC: 8.8 MG/DL — SIGNIFICANT CHANGE UP (ref 8.4–10.5)
CHLORIDE SERPL-SCNC: 98 MMOL/L — SIGNIFICANT CHANGE UP (ref 96–108)
CO2 SERPL-SCNC: 24 MMOL/L — SIGNIFICANT CHANGE UP (ref 22–31)
CREAT SERPL-MCNC: 0.96 MG/DL — SIGNIFICANT CHANGE UP (ref 0.5–1.3)
EGFR: 70 ML/MIN/1.73M2 — SIGNIFICANT CHANGE UP
GLUCOSE SERPL-MCNC: 115 MG/DL — HIGH (ref 70–99)
HCT VFR BLD CALC: 40.9 % — SIGNIFICANT CHANGE UP (ref 34.5–45)
HGB BLD-MCNC: 14.1 G/DL — SIGNIFICANT CHANGE UP (ref 11.5–15.5)
MCHC RBC-ENTMCNC: 29.7 PG — SIGNIFICANT CHANGE UP (ref 27–34)
MCHC RBC-ENTMCNC: 34.5 GM/DL — SIGNIFICANT CHANGE UP (ref 32–36)
MCV RBC AUTO: 86.3 FL — SIGNIFICANT CHANGE UP (ref 80–100)
NRBC # BLD: 0 /100 WBCS — SIGNIFICANT CHANGE UP (ref 0–0)
PLATELET # BLD AUTO: 245 K/UL — SIGNIFICANT CHANGE UP (ref 150–400)
POTASSIUM SERPL-MCNC: 3.4 MMOL/L — LOW (ref 3.5–5.3)
POTASSIUM SERPL-SCNC: 3.4 MMOL/L — LOW (ref 3.5–5.3)
PROT SERPL-MCNC: 7.9 G/DL — SIGNIFICANT CHANGE UP (ref 6–8.3)
RBC # BLD: 4.74 M/UL — SIGNIFICANT CHANGE UP (ref 3.8–5.2)
RBC # FLD: 13 % — SIGNIFICANT CHANGE UP (ref 10.3–14.5)
SODIUM SERPL-SCNC: 135 MMOL/L — SIGNIFICANT CHANGE UP (ref 135–145)
WBC # BLD: 6 K/UL — SIGNIFICANT CHANGE UP (ref 3.8–10.5)
WBC # FLD AUTO: 6 K/UL — SIGNIFICANT CHANGE UP (ref 3.8–10.5)

## 2024-01-18 PROCEDURE — 82962 GLUCOSE BLOOD TEST: CPT

## 2024-01-18 PROCEDURE — 95705 EEG W/O VID 2-12 HR UNMNTR: CPT

## 2024-01-18 PROCEDURE — 81001 URINALYSIS AUTO W/SCOPE: CPT

## 2024-01-18 PROCEDURE — 70450 CT HEAD/BRAIN W/O DYE: CPT | Mod: MG

## 2024-01-18 PROCEDURE — 99232 SBSQ HOSP IP/OBS MODERATE 35: CPT

## 2024-01-18 PROCEDURE — 85025 COMPLETE CBC W/AUTO DIFF WBC: CPT

## 2024-01-18 PROCEDURE — 80307 DRUG TEST PRSMV CHEM ANLYZR: CPT

## 2024-01-18 PROCEDURE — 96374 THER/PROPH/DIAG INJ IV PUSH: CPT

## 2024-01-18 PROCEDURE — 99285 EMERGENCY DEPT VISIT HI MDM: CPT

## 2024-01-18 PROCEDURE — 96375 TX/PRO/DX INJ NEW DRUG ADDON: CPT

## 2024-01-18 PROCEDURE — 83735 ASSAY OF MAGNESIUM: CPT

## 2024-01-18 PROCEDURE — G1004: CPT

## 2024-01-18 PROCEDURE — 83605 ASSAY OF LACTIC ACID: CPT

## 2024-01-18 PROCEDURE — 87040 BLOOD CULTURE FOR BACTERIA: CPT

## 2024-01-18 PROCEDURE — 36415 COLL VENOUS BLD VENIPUNCTURE: CPT

## 2024-01-18 PROCEDURE — 84100 ASSAY OF PHOSPHORUS: CPT

## 2024-01-18 PROCEDURE — 80053 COMPREHEN METABOLIC PANEL: CPT

## 2024-01-18 PROCEDURE — 95700 EEG CONT REC W/VID EEG TECH: CPT

## 2024-01-18 PROCEDURE — 71045 X-RAY EXAM CHEST 1 VIEW: CPT

## 2024-01-18 PROCEDURE — 85027 COMPLETE CBC AUTOMATED: CPT

## 2024-01-18 PROCEDURE — 0225U NFCT DS DNA&RNA 21 SARSCOV2: CPT

## 2024-01-18 RX ORDER — LOSARTAN POTASSIUM 100 MG/1
100 TABLET, FILM COATED ORAL DAILY
Refills: 0 | Status: DISCONTINUED | OUTPATIENT
Start: 2024-01-18 | End: 2024-01-18

## 2024-01-18 RX ORDER — LOSARTAN POTASSIUM 100 MG/1
1 TABLET, FILM COATED ORAL
Refills: 0 | DISCHARGE

## 2024-01-18 RX ORDER — LOSARTAN POTASSIUM 100 MG/1
50 TABLET, FILM COATED ORAL ONCE
Refills: 0 | Status: COMPLETED | OUTPATIENT
Start: 2024-01-18 | End: 2024-01-18

## 2024-01-18 RX ORDER — LOSARTAN POTASSIUM 100 MG/1
1 TABLET, FILM COATED ORAL
Qty: 30 | Refills: 1
Start: 2024-01-18 | End: 2024-03-17

## 2024-01-18 RX ORDER — POTASSIUM CHLORIDE 20 MEQ
40 PACKET (EA) ORAL ONCE
Refills: 0 | Status: COMPLETED | OUTPATIENT
Start: 2024-01-18 | End: 2024-01-18

## 2024-01-18 RX ADMIN — ESCITALOPRAM OXALATE 10 MILLIGRAM(S): 10 TABLET, FILM COATED ORAL at 11:23

## 2024-01-18 RX ADMIN — Medication 40 MILLIEQUIVALENT(S): at 11:23

## 2024-01-18 RX ADMIN — LOSARTAN POTASSIUM 50 MILLIGRAM(S): 100 TABLET, FILM COATED ORAL at 06:08

## 2024-01-18 RX ADMIN — Medication 75 MILLIGRAM(S): at 06:08

## 2024-01-18 RX ADMIN — ENOXAPARIN SODIUM 40 MILLIGRAM(S): 100 INJECTION SUBCUTANEOUS at 06:09

## 2024-01-18 RX ADMIN — LOSARTAN POTASSIUM 50 MILLIGRAM(S): 100 TABLET, FILM COATED ORAL at 11:23

## 2024-01-18 NOTE — DISCHARGE NOTE PROVIDER - HOSPITAL COURSE
52yo F PMH HTN, L breast mass (noticed 2 weeks ago still being worked up) presented for witnessed seizure at home. Pt states that yesterday she woke up feeling weak and had fever and cough. Stayed in bed almost all day yesterday. This morning she woke up intending to go to work but had a headache and still felt weak, and had fever and cough. Per her  at bedside pt was sitting up at the side of their bed and stated she did not feel well and she laid back down for 10 minutes. She then sat up, said out loud that she needed to throw up and use the bathroom, and then all of a sudden started shaking.  states her whole body was shaking and she fell back onto the bed. During this episode she was foaming at the mouth, bit her tongue and was incontinent of bowel and bladder. Episode lasted "a few minutes" and stopped on its own. Her  called 911 and she was brought to the hospital. EEG showed L temporal slowing but no further active seizures. Patient tested positive for influenza and was started on tamiflu and supportive care. Recommend outpatient brain MRI w/ and w/out contrast given new L breast mass that is still being worked up. Follow up outpatient with Epilepsy.     EEG: L temporal focal slowing.     Discharge neuro exam  Neurological Examination:  General:  Appearance is consistent with chronologic age.   Cognitive/Language:  Awake, alert, and oriented to person, place, time and date.  Recent and remote memory intact.  Fund of knowledge is appropriate.  Naming, repetition and comprehension intact. Nondysarthric.    Cranial Nerves  - Eyes: Visual acuity intact, Visual fields full.  EOMI w/o nystagmus, skew or reported double vision.  PERRL.  No ptosis/weakness of eyelid closure.    - Face:  Facial sensation normal V1 - 3, no facial asymmetry.    - Ears/Nose/Throat:  Hearing grossly intact b/l to finger rub.  Palate elevates midline.  Tongue and uvula midline.   Motor exam: Normal tone and bulk. No tenderness, twitching, tremors or involuntary movements.            Upper extremity                  Bicep     Tricep     HG                                                 R      5/5        5/5          5/5                                                    L       5/5        5/5          5/5              Lower extremity                   HF        KE        DF         PF                                                  R     5/5       5/5       5/5       5/5                                               L      5/5      5/5       5/5        5/5    Sensory examination:  Intact to light touch and pinprick, pain, temperature and proprioception and vibration in all extremities.  Reflexes: 2+ b/l biceps, triceps, patella and achilles.  Plantar response downgoing b/l.  Zia, clonus absent.    Outpatient Imaging: Brain MRI w/ and w/out contrast    Follow up outpatient with Dr. Aguirre, epileptologist  Follow up with PMD to monitor LFTs.

## 2024-01-18 NOTE — PROGRESS NOTE ADULT - SUBJECTIVE AND OBJECTIVE BOX
Neurology Progress Note    Interval History:    Patient was seen and examined, afebrile overnight. pt endorses watery brown stool. No headache nausea or vomiting. She denies any numbness, tingling, or weakness.     Medications:  acetaminophen     Tablet .. 975 milliGRAM(s) Oral every 6 hours PRN  enoxaparin Injectable 40 milliGRAM(s) SubCutaneous every 12 hours  escitalopram 10 milliGRAM(s) Oral daily  influenza   Vaccine 0.5 milliLiter(s) IntraMuscular once  losartan 100 milliGRAM(s) Oral daily  losartan 50 milliGRAM(s) Oral once  melatonin 5 milliGRAM(s) Oral at bedtime PRN  oseltamivir 75 milliGRAM(s) Oral two times a day  potassium chloride   Powder 40 milliEquivalent(s) Oral once      Vital Signs Last 24 Hrs  T(C): 36.9 (18 Jan 2024 05:38), Max: 37.6 (17 Jan 2024 21:04)  T(F): 98.5 (18 Jan 2024 05:38), Max: 99.7 (17 Jan 2024 21:04)  HR: 79 (18 Jan 2024 06:02) (76 - 79)  BP: 144/84 (18 Jan 2024 06:02) (130/93 - 151/104)  BP(mean): --  RR: 18 (18 Jan 2024 05:38) (16 - 18)  SpO2: 99% (18 Jan 2024 05:38) (97% - 99%)    Parameters below as of 18 Jan 2024 05:38  Patient On (Oxygen Delivery Method): room air        Neurological Examination:  General:  Appearance is consistent with chronologic age.   Cognitive/Language:  Awake, alert, and oriented to person, place, time and date.  Recent and remote memory intact.  Fund of knowledge is appropriate.  Naming, repetition and comprehension intact. Nondysarthric.    Cranial Nerves  - Eyes: Visual acuity intact, Visual fields full.  EOMI w/o nystagmus, skew or reported double vision.  PERRL.  No ptosis/weakness of eyelid closure.    - Face:  Facial sensation normal V1 - 3, no facial asymmetry.    - Ears/Nose/Throat:  Hearing grossly intact b/l to finger rub.  Palate elevates midline.  Tongue and uvula midline.   Motor exam: Normal tone and bulk. No tenderness, twitching, tremors or involuntary movements.            Upper extremity                  Bicep     Tricep     HG                                                 R      5/5        5/5          5/5                                                    L       5/5        5/5          5/5              Lower extremity                   HF        KE        DF         PF                                                  R     5/5       5/5       5/5       5/5                                               L      5/5      5/5       5/5        5/5    Sensory examination:  Intact to light touch and pinprick, pain, temperature and proprioception and vibration in all extremities.  Reflexes: 2+ b/l biceps, triceps, patella and achilles.  Plantar response downgoing b/l.  Zia, clonus absent.    Labs:  CBC Full  -  ( 18 Jan 2024 05:30 )  WBC Count : 6.00 K/uL  RBC Count : 4.74 M/uL  Hemoglobin : 14.1 g/dL  Hematocrit : 40.9 %  Platelet Count - Automated : 245 K/uL  Mean Cell Volume : 86.3 fl  Mean Cell Hemoglobin : 29.7 pg  Mean Cell Hemoglobin Concentration : 34.5 gm/dL  Auto Neutrophil # : x  Auto Lymphocyte # : x  Auto Monocyte # : x  Auto Eosinophil # : x  Auto Basophil # : x  Auto Neutrophil % : x  Auto Lymphocyte % : x  Auto Monocyte % : x  Auto Eosinophil % : x  Auto Basophil % : x    01-18    135  |  98  |  10  ----------------------------<  115<H>  3.4<L>   |  24  |  0.96    Ca    8.8      18 Jan 2024 05:30    TPro  7.9  /  Alb  3.8  /  TBili  0.4  /  DBili  x   /  AST  99<H>  /  ALT  34  /  AlkPhos  109  01-18    LIVER FUNCTIONS - ( 18 Jan 2024 05:30 )  Alb: 3.8 g/dL / Pro: 7.9 g/dL / ALK PHOS: 109 U/L / ALT: 34 U/L / AST: 99 U/L / GGT: x             Urinalysis Basic - ( 18 Jan 2024 05:30 )    Color: x / Appearance: x / SG: x / pH: x  Gluc: 115 mg/dL / Ketone: x  / Bili: x / Urobili: x   Blood: x / Protein: x / Nitrite: x   Leuk Esterase: x / RBC: x / WBC x   Sq Epi: x / Non Sq Epi: x / Bacteria: x        RADIOLOGY & ADDITIONAL TESTS:  
Feeling much better today.  Still occasionally having some loose stools, as she had been at home when she first started developing flu symptoms.  When she coughs, has some abdominal pain.  Breathing okay, no chest pain.     Remaining ROS negative       PHYSICAL EXAM:    General: no acute distress, sitting up in bed  HEENT: NC/AT; MMM  Cardiovascular: +S1/S2, RRR  Respiratory: CTA B/L; no W/R/R  Gastrointestinal: soft, NT/ND; +BSx4  Extremities: WWP; no edema  Psychiatric: pleasant mood and affect  Dermatologic: no appreciable wounds or damage to the skin    VITAL SIGNS:  Vital Signs Last 24 Hrs  T(C): 36.9 (18 Jan 2024 05:38), Max: 37.6 (17 Jan 2024 21:04)  T(F): 98.5 (18 Jan 2024 05:38), Max: 99.7 (17 Jan 2024 21:04)  HR: 79 (18 Jan 2024 06:02) (76 - 79)  BP: 144/84 (18 Jan 2024 06:02) (144/84 - 151/104)  BP(mean): --  RR: 18 (18 Jan 2024 05:38) (17 - 18)  SpO2: 99% (18 Jan 2024 05:38) (97% - 99%)    Parameters below as of 18 Jan 2024 05:38  Patient On (Oxygen Delivery Method): room air          MEDICATIONS:  MEDICATIONS  (STANDING):  enoxaparin Injectable 40 milliGRAM(s) SubCutaneous every 12 hours  escitalopram 10 milliGRAM(s) Oral daily  influenza   Vaccine 0.5 milliLiter(s) IntraMuscular once  losartan 100 milliGRAM(s) Oral daily  oseltamivir 75 milliGRAM(s) Oral two times a day    MEDICATIONS  (PRN):  acetaminophen     Tablet .. 975 milliGRAM(s) Oral every 6 hours PRN Temp greater or equal to 38C (100.4F)  melatonin 5 milliGRAM(s) Oral at bedtime PRN Insomnia      ALLERGIES:  Allergies    penicillin (Unknown)  lisinopril (Angioedema)    Intolerances        LABS:                        14.1   6.00  )-----------( 245      ( 18 Jan 2024 05:30 )             40.9     01-18    135  |  98  |  10  ----------------------------<  115<H>  3.4<L>   |  24  |  0.96    Ca    8.8      18 Jan 2024 05:30    TPro  7.9  /  Alb  3.8  /  TBili  0.4  /  DBili  x   /  AST  99<H>  /  ALT  34  /  AlkPhos  109  01-18      Urinalysis Basic - ( 18 Jan 2024 05:30 )    Color: x / Appearance: x / SG: x / pH: x  Gluc: 115 mg/dL / Ketone: x  / Bili: x / Urobili: x   Blood: x / Protein: x / Nitrite: x   Leuk Esterase: x / RBC: x / WBC x   Sq Epi: x / Non Sq Epi: x / Bacteria: x      CAPILLARY BLOOD GLUCOSE          RADIOLOGY & ADDITIONAL TESTS: Reviewed.
Neurology Progress Note    Interval History:    Patient was seen and examined, pt currently on video eeg monitor she endorses stomach ache, generalized body aches, watery brown stools and cough. No fever overnight. No reported clinical seizures overnight.     Medications:  acetaminophen     Tablet .. 975 milliGRAM(s) Oral every 6 hours PRN  enoxaparin Injectable 40 milliGRAM(s) SubCutaneous every 24 hours  escitalopram 10 milliGRAM(s) Oral daily  influenza   Vaccine 0.5 milliLiter(s) IntraMuscular once  losartan 50 milliGRAM(s) Oral daily  oseltamivir 75 milliGRAM(s) Oral two times a day      Vital Signs Last 24 Hrs  T(C): 37.1 (17 Jan 2024 12:19), Max: 39 (16 Jan 2024 15:20)  T(F): 98.8 (17 Jan 2024 12:19), Max: 102.2 (16 Jan 2024 15:20)  HR: 79 (17 Jan 2024 12:19) (79 - 111)  BP: 130/93 (17 Jan 2024 12:19) (113/67 - 160/107)  BP(mean): --  RR: 16 (17 Jan 2024 12:19) (16 - 18)  SpO2: 97% (17 Jan 2024 12:19) (93% - 97%)    Parameters below as of 17 Jan 2024 12:19  Patient On (Oxygen Delivery Method): room air        Neurological Examination:  General:  Appearance is consistent with chronologic age.   Cognitive/Language:  Awake, alert, and oriented to person, place, time and date.  Recent and remote memory intact.  Fund of knowledge is appropriate.  Naming, repetition and comprehension intact. Nondysarthric.    Cranial Nerves  - Eyes: Visual acuity intact, Visual fields full.  EOMI w/o nystagmus, skew or reported double vision.  PERRL.  No ptosis/weakness of eyelid closure.    - Face:  Facial sensation normal V1 - 3, no facial asymmetry.    - Ears/Nose/Throat:  Hearing grossly intact b/l to finger rub.  Palate elevates midline.  Tongue and uvula midline.   Motor exam: Normal tone and bulk. No tenderness, twitching, tremors or involuntary movements.            Upper extremity                  Bicep     Tricep     HG                                                 R      5/5        5/5          5/5                                                    L       5/5        5/5          5/5              Lower extremity                   HF        KE        DF         PF                                                  R     5/5       5/5       5/5       5/5                                               L      5/5      5/5       5/5        5/5    Sensory examination:  Intact to light touch and pinprick, pain, temperature and proprioception and vibration in all extremities.  Reflexes: 2+ b/l biceps, triceps, patella and achilles.  Plantar response downgoing b/l.  Zia, clonus absent.    Labs:  CBC Full  -  ( 17 Jan 2024 05:30 )  WBC Count : 5.31 K/uL  RBC Count : 4.40 M/uL  Hemoglobin : 12.9 g/dL  Hematocrit : 37.7 %  Platelet Count - Automated : 219 K/uL  Mean Cell Volume : 85.7 fl  Mean Cell Hemoglobin : 29.3 pg  Mean Cell Hemoglobin Concentration : 34.2 gm/dL  Auto Neutrophil # : x  Auto Lymphocyte # : x  Auto Monocyte # : x  Auto Eosinophil # : x  Auto Basophil # : x  Auto Neutrophil % : x  Auto Lymphocyte % : x  Auto Monocyte % : x  Auto Eosinophil % : x  Auto Basophil % : x    01-17    137  |  102  |  11  ----------------------------<  100<H>  3.1<L>   |  21<L>  |  0.91    Ca    8.6      17 Jan 2024 05:30  Phos  2.3     01-16  Mg     2.3     01-16    TPro  7.6  /  Alb  3.7  /  TBili  0.4  /  DBili  x   /  AST  69<H>  /  ALT  26  /  AlkPhos  109  01-17    LIVER FUNCTIONS - ( 17 Jan 2024 05:30 )  Alb: 3.7 g/dL / Pro: 7.6 g/dL / ALK PHOS: 109 U/L / ALT: 26 U/L / AST: 69 U/L / GGT: x             Urinalysis Basic - ( 17 Jan 2024 05:30 )    Color: x / Appearance: x / SG: x / pH: x  Gluc: 100 mg/dL / Ketone: x  / Bili: x / Urobili: x   Blood: x / Protein: x / Nitrite: x   Leuk Esterase: x / RBC: x / WBC x   Sq Epi: x / Non Sq Epi: x / Bacteria: x        RADIOLOGY & ADDITIONAL TESTS:

## 2024-01-18 NOTE — DISCHARGE NOTE NURSING/CASE MANAGEMENT/SOCIAL WORK - NSDCPEFALRISK_GEN_ALL_CORE
For information on Fall & Injury Prevention, visit: [email or web address] OR  [email or web address] OR  https://www.cdc.gov/steadi/patient.html

## 2024-01-18 NOTE — DISCHARGE NOTE PROVIDER - PROVIDER TOKENS
PROVIDER:[TOKEN:[[id number]],FOLLOWUP:[2 weeks]] PROVIDER:[TOKEN:[[id number]],SCHEDULEDAPPT:[01/22/2024],SCHEDULEDAPPTTIME:[04:00 PM]]

## 2024-01-18 NOTE — DISCHARGE NOTE PROVIDER - CARE PROVIDER_API CALL
Mati Aguirre  Neurology  [address] [address]  Phone: [phone]  Fax: [phone]  Follow Up Time: 2 weeks   Mati Aguirre  Neurology  [address] [address]  Phone: [phone]  Fax: [phone]  Scheduled Appointment: 01/22/2024 04:00 PM

## 2024-01-18 NOTE — DISCHARGE NOTE PROVIDER - NSDCMRMEDTOKEN_GEN_ALL_CORE_FT
Lexapro 10 mg oral tablet: 1 tab(s) orally once a day  losartan 100 mg oral tablet: 1 tab(s) orally once a day  oseltamivir 75 mg oral capsule: 1 cap(s) orally 2 times a day

## 2024-01-18 NOTE — DISCHARGE NOTE NURSING/CASE MANAGEMENT/SOCIAL WORK - PATIENT PORTAL LINK FT
You can access the FollowMyHealth Patient Portal offered by [facility] by registering at the following website: [email or web address]. By joining [employer]’s FollowMyHealth portal, you will also be able to view your health information using other applications (apps) compatible with our system.

## 2024-01-18 NOTE — DISCHARGE NOTE PROVIDER - NSDCFUADDINST_GEN_ALL_CORE_FT
Please make sure to follow up outpatient with Dr. Aguirre within 2 weeks of discharge our office will give you a call regarding your follow up appointment. Please have your Brain MRI w/ and w/out contrast done outpatient.     For Influenza continue tamiflu for 3 more days and continue to drink lots of fluids and follow up with your primary care doctor. Please get your liver enzymes checked with your primary care doctor.

## 2024-01-18 NOTE — DISCHARGE NOTE PROVIDER - NSDCCPCAREPLAN_GEN_ALL_CORE_FT
PRINCIPAL DISCHARGE DIAGNOSIS  Diagnosis: Seizure  Assessment and Plan of Treatment: You have a brain condition where you are prone to getting seizures. Seizures are abnormal electrical activity in your brain which can cause you have different symptoms such as staring off into space, jerking movements, loss of consciousness, and more. Everyone’s seizure is unique. Seizures are dangerous because they can cause you to stop breathing, cause permanent damage to your brain, and puts you at risk for falls and environmental hazards. Please continue taking your seizure medications as prescribed. Call 911 or seek immediate care if your seizure lasts longer than 5 minutes, have trouble breathing, have a second seizure within 24 hours of your first, or if you are injured during a seizure. Please follow with your neurologist, to be evaluated for your seizures.        SECONDARY DISCHARGE DIAGNOSES  Diagnosis: Influenza in adult  Assessment and Plan of Treatment: You tested positive for the flu. Common flu symptoms include diarrhea, nausea, vomiting, cough, sore throat, fevers, generalized body aches. Please continue taking tamiflu for 3 more days. Please continue to drink lots of fluids. You may take tylenol is you experience any fevers. Please follow up with your primary care doctor within 1 week of discharge.

## 2024-01-18 NOTE — PROGRESS NOTE ADULT - ASSESSMENT
52yo F PMH HTN BIBEMS after witnessed seizure at home,. No personal or family history of seizure or seizure disorder. Neurology consulted for first-time seizure. Low suspicion for Meningitis given no meningeal signs and return back to baseline mental status.  Though still sleepy.    #New onset GTC seizure 2/2 infection vs tumor   - EEG showing L temporal mild focal slowing suggestive of underlying cerebral dysfunction  - No LP at this time given resolution of confusion/cognitive impairment and return to baseline  - Brain MRI w/ and w/out contrast ordered    #Influenza  - Tylenol 975 mg q6h PRN for fevers  - Tamiflu 75 mg q12h x5 day course (1/16-1/20)    #HTN, chronic  - continue losartan 50 mg qd    Diet: Regular  DVT ppx: Lovenox  
53F with PMH of HTN and L breast mass being worked up, presenting with new onset seizures and admitted for further workup.     #Seizures  - plan per primary team  - possibly provoked in the setting of high fevers with influenza    #HTN  - continue home BP regimen    #Influenza  - continue with tamiflu  - robitussin for cough, and other supportive care    #Mild transaminitis  #Loose stools (POA)  - AST 99, with other LFTs normal.  Bilirubin normal.  No RUQ pain.   - having occasional loose stools, no leukocytosis or fever.  Transaminitis and loose stools attributed to influenza A infection.  Please have patient follow up with primary care doctor and have LFTs repeated in 3-5 days.  Primary team discharging patient today, and communicated these recommendations with the team.  If LFTs are persistently elevated in outpatient setting, can have RUQ ultrasound or other workup per provider.      #Breast mass  - outpatient follow up    Moderate level of medical decision making including the presence of two chronic medical issues and discussion of plan with the neurology team. 
54yo F PMH HTN BIBEMS after witnessed seizure at home,. No personal or family history of seizure or seizure disorder. Neurology consulted for first-time seizure. Low suspicion for Meningitis given no meningeal signs and return back to baseline mental status.  Though still sleepy.    #New onset GTC seizure 2/2 infection vs tumor   - EEG showing L temporal mild focal slowing suggestive of underlying cerebral dysfunction  - No LP at this time given resolution of confusion/cognitive impairment and return to baseline  - Brain MRI w/ and w/out contrast ordered    #Influenza  - Tylenol 975 mg q6h PRN for fevers  - Tamiflu 75 mg q12h x5 day course (1/16-1/20)    #HTN, chronic  - increase losartan to 100 mg qd    Diet: Regular  DVT ppx: Lovenox

## 2024-01-18 NOTE — DISCHARGE NOTE PROVIDER - NSDCFUSCHEDAPPT_GEN_ALL_CORE_FT
[facility]  NEUROLOGY [address]  Scheduled Appointment: 01/22/2024

## 2024-01-19 ENCOUNTER — NON-APPOINTMENT (OUTPATIENT)
Age: 54
End: 2024-01-19

## 2024-01-19 DIAGNOSIS — R56.9 UNSPECIFIED CONVULSIONS: ICD-10-CM

## 2024-01-20 LAB — DRUG SCREEN, SERUM: ABNORMAL

## 2024-01-21 LAB
CULTURE RESULTS: SIGNIFICANT CHANGE UP
CULTURE RESULTS: SIGNIFICANT CHANGE UP
SPECIMEN SOURCE: SIGNIFICANT CHANGE UP
SPECIMEN SOURCE: SIGNIFICANT CHANGE UP

## 2024-01-22 ENCOUNTER — APPOINTMENT (OUTPATIENT)
Dept: NEUROLOGY | Facility: CLINIC | Age: 54
End: 2024-01-22

## 2024-01-22 VITALS
TEMPERATURE: 97.9 F | OXYGEN SATURATION: 98 % | HEIGHT: 66 IN | BODY MASS INDEX: 42.11 KG/M2 | WEIGHT: 262 LBS | HEART RATE: 68 BPM | SYSTOLIC BLOOD PRESSURE: 99 MMHG | DIASTOLIC BLOOD PRESSURE: 62 MMHG

## 2024-01-22 VITALS — DIASTOLIC BLOOD PRESSURE: 73 MMHG | SYSTOLIC BLOOD PRESSURE: 107 MMHG

## 2024-01-22 DIAGNOSIS — Z78.9 OTHER SPECIFIED HEALTH STATUS: ICD-10-CM

## 2024-01-22 DIAGNOSIS — Z82.49 FAMILY HISTORY OF ISCHEMIC HEART DISEASE AND OTHER DISEASES OF THE CIRCULATORY SYSTEM: ICD-10-CM

## 2024-01-22 RX ORDER — LOSARTAN POTASSIUM 100 MG/1
100 TABLET, FILM COATED ORAL DAILY
Refills: 0 | Status: ACTIVE | COMMUNITY

## 2024-01-22 RX ORDER — AMLODIPINE BESYLATE 10 MG/1
10 TABLET ORAL DAILY
Refills: 0 | Status: ACTIVE | COMMUNITY

## 2024-01-22 RX ORDER — ESCITALOPRAM OXALATE 10 MG/1
10 TABLET, FILM COATED ORAL DAILY
Refills: 0 | Status: ACTIVE | COMMUNITY

## 2024-01-22 NOTE — HISTORY OF PRESENT ILLNESS
[FreeTextEntry1] :  is a 53 year old female presenting as a hospital follow up for seizures.  History of one seizure in her life.  Admitted to [facility]  New L breast mass MRI brain

## 2024-01-23 ENCOUNTER — APPOINTMENT (OUTPATIENT)
Dept: MRI IMAGING | Facility: CLINIC | Age: 54
End: 2024-01-23
Payer: COMMERCIAL

## 2024-01-23 PROCEDURE — A9585: CPT

## 2024-01-23 PROCEDURE — 70553 MRI BRAIN STEM W/O & W/DYE: CPT

## 2024-04-28 ENCOUNTER — EMERGENCY (EMERGENCY)
Facility: HOSPITAL | Age: 54
LOS: 1 days | Discharge: HOME | End: 2024-04-28
Admitting: STUDENT IN AN ORGANIZED HEALTH CARE EDUCATION/TRAINING PROGRAM
Payer: COMMERCIAL

## 2024-04-28 VITALS
HEART RATE: 83 BPM | SYSTOLIC BLOOD PRESSURE: 150 MMHG | TEMPERATURE: 99 F | OXYGEN SATURATION: 98 % | RESPIRATION RATE: 18 BRPM | DIASTOLIC BLOOD PRESSURE: 93 MMHG

## 2024-04-28 DIAGNOSIS — Z98.890 OTHER SPECIFIED POSTPROCEDURAL STATES: Chronic | ICD-10-CM

## 2024-04-28 DIAGNOSIS — Z98.51 TUBAL LIGATION STATUS: Chronic | ICD-10-CM

## 2024-04-28 DIAGNOSIS — Z98.891 HISTORY OF UTERINE SCAR FROM PREVIOUS SURGERY: Chronic | ICD-10-CM

## 2024-04-28 LAB
ANION GAP SERPL CALC-SCNC: 13 MMOL/L — SIGNIFICANT CHANGE UP (ref 5–17)
BASOPHILS # BLD AUTO: 0.01 K/UL — SIGNIFICANT CHANGE UP (ref 0–0.2)
BASOPHILS NFR BLD AUTO: 0.2 % — SIGNIFICANT CHANGE UP (ref 0–2)
BUN SERPL-MCNC: 15 MG/DL — SIGNIFICANT CHANGE UP (ref 7–23)
CALCIUM SERPL-MCNC: 9.1 MG/DL — SIGNIFICANT CHANGE UP (ref 8.4–10.5)
CHLORIDE SERPL-SCNC: 102 MMOL/L — SIGNIFICANT CHANGE UP (ref 96–108)
CO2 SERPL-SCNC: 25 MMOL/L — SIGNIFICANT CHANGE UP (ref 22–31)
CREAT SERPL-MCNC: 1.06 MG/DL — SIGNIFICANT CHANGE UP (ref 0.5–1.3)
EGFR: 62 ML/MIN/1.73M2 — SIGNIFICANT CHANGE UP
EOSINOPHIL # BLD AUTO: 0.16 K/UL — SIGNIFICANT CHANGE UP (ref 0–0.5)
EOSINOPHIL NFR BLD AUTO: 2.8 % — SIGNIFICANT CHANGE UP (ref 0–6)
FLUAV AG NPH QL: SIGNIFICANT CHANGE UP
FLUBV AG NPH QL: SIGNIFICANT CHANGE UP
GLUCOSE SERPL-MCNC: 119 MG/DL — HIGH (ref 70–99)
HCT VFR BLD CALC: 36.2 % — SIGNIFICANT CHANGE UP (ref 34.5–45)
HGB BLD-MCNC: 12 G/DL — SIGNIFICANT CHANGE UP (ref 11.5–15.5)
IMM GRANULOCYTES NFR BLD AUTO: 0.7 % — SIGNIFICANT CHANGE UP (ref 0–0.9)
LYMPHOCYTES # BLD AUTO: 1.44 K/UL — SIGNIFICANT CHANGE UP (ref 1–3.3)
LYMPHOCYTES # BLD AUTO: 25.1 % — SIGNIFICANT CHANGE UP (ref 13–44)
MCHC RBC-ENTMCNC: 29.9 PG — SIGNIFICANT CHANGE UP (ref 27–34)
MCHC RBC-ENTMCNC: 33.1 GM/DL — SIGNIFICANT CHANGE UP (ref 32–36)
MCV RBC AUTO: 90.3 FL — SIGNIFICANT CHANGE UP (ref 80–100)
MONOCYTES # BLD AUTO: 0.43 K/UL — SIGNIFICANT CHANGE UP (ref 0–0.9)
MONOCYTES NFR BLD AUTO: 7.5 % — SIGNIFICANT CHANGE UP (ref 2–14)
NEUTROPHILS # BLD AUTO: 3.65 K/UL — SIGNIFICANT CHANGE UP (ref 1.8–7.4)
NEUTROPHILS NFR BLD AUTO: 63.7 % — SIGNIFICANT CHANGE UP (ref 43–77)
NRBC # BLD: 0 /100 WBCS — SIGNIFICANT CHANGE UP (ref 0–0)
NT-PROBNP SERPL-SCNC: 72 PG/ML — SIGNIFICANT CHANGE UP (ref 0–300)
PLATELET # BLD AUTO: 234 K/UL — SIGNIFICANT CHANGE UP (ref 150–400)
POTASSIUM SERPL-MCNC: 3.5 MMOL/L — SIGNIFICANT CHANGE UP (ref 3.5–5.3)
POTASSIUM SERPL-SCNC: 3.5 MMOL/L — SIGNIFICANT CHANGE UP (ref 3.5–5.3)
RBC # BLD: 4.01 M/UL — SIGNIFICANT CHANGE UP (ref 3.8–5.2)
RBC # FLD: 13.2 % — SIGNIFICANT CHANGE UP (ref 10.3–14.5)
RSV RNA NPH QL NAA+NON-PROBE: SIGNIFICANT CHANGE UP
SARS-COV-2 RNA SPEC QL NAA+PROBE: SIGNIFICANT CHANGE UP
SODIUM SERPL-SCNC: 140 MMOL/L — SIGNIFICANT CHANGE UP (ref 135–145)
WBC # BLD: 5.73 K/UL — SIGNIFICANT CHANGE UP (ref 3.8–10.5)
WBC # FLD AUTO: 5.73 K/UL — SIGNIFICANT CHANGE UP (ref 3.8–10.5)

## 2024-04-28 PROCEDURE — 99285 EMERGENCY DEPT VISIT HI MDM: CPT | Mod: 25

## 2024-04-28 PROCEDURE — 71046 X-RAY EXAM CHEST 2 VIEWS: CPT

## 2024-04-28 PROCEDURE — 93005 ELECTROCARDIOGRAM TRACING: CPT

## 2024-04-28 PROCEDURE — 85025 COMPLETE CBC W/AUTO DIFF WBC: CPT

## 2024-04-28 PROCEDURE — 80048 BASIC METABOLIC PNL TOTAL CA: CPT

## 2024-04-28 PROCEDURE — 71046 X-RAY EXAM CHEST 2 VIEWS: CPT | Mod: 26

## 2024-04-28 PROCEDURE — 83880 ASSAY OF NATRIURETIC PEPTIDE: CPT

## 2024-04-28 PROCEDURE — 36415 COLL VENOUS BLD VENIPUNCTURE: CPT

## 2024-04-28 PROCEDURE — 93970 EXTREMITY STUDY: CPT | Mod: 26

## 2024-04-28 PROCEDURE — 87637 SARSCOV2&INF A&B&RSV AMP PRB: CPT

## 2024-04-28 PROCEDURE — 99285 EMERGENCY DEPT VISIT HI MDM: CPT

## 2024-04-28 PROCEDURE — 93970 EXTREMITY STUDY: CPT

## 2024-04-28 NOTE — ED PROVIDER NOTE - CLINICAL SUMMARY MEDICAL DECISION MAKING FREE TEXT BOX
52 y/o female w/ no sig pmh p/w diffuse b/l leg pain and swelling x approx 1 mo.  States had mech trip and fall approx 1 mo ago, fell up 1 step and thinks started after that.  States over past few days noticed some lightheadedness, cough, and occ mild sob.  Denies f/c, cp, palpitations, abd pain, back pain, numbness/tingling/weakness to ext.  Denies smoking, recent travel, exogenous hormone use, or hx dvt/pe.    VS notable for /93  Exam with mild nonpitting edema b/l lower ext and diffuse generalized discomfort, no focal bony ttp.  Nl cardiopulm exam.  No ttp to back  Will check ekg, labs, bnp, cxr, us b/l lower ext to r/o dvt, electrolyte ab, arrythmia, chf.  could be dependent edema.  doubt acute fx, as pt with FROM, no focal bony ttp and has been ambulatory since fall 54 y/o female w/ hx htn p/w diffuse b/l leg pain and swelling x approx 1 mo.  States had mech trip and fall approx 1 mo ago, fell up 1 step and thinks started after that.  Denies head injury at time.  Was never evaluated after fall.  States over past few days noticed some lightheadedness, cough, and occ mild sob.  Denies f/c, ha, cp, palpitations, abd pain, back pain, numbness/tingling/weakness to ext.  Denies smoking, recent travel, exogenous hormone use, or hx dvt/pe.  VS notable for /93  Exam with mild nonpitting edema b/l lower ext and diffuse generalized discomfort, no focal bony ttp.  Nl cardiopulm exam.  No ttp to back  Will check ekg, labs, bnp, cxr, us b/l lower ext to r/o dvt, electrolyte ab, arrythmia, chf.  could be dependent edema.  doubt acute fx, as pt with FROM, no focal bony ttp and has been ambulatory since fall 54 y/o female w/ hx htn p/w diffuse b/l leg pain and swelling x approx 1 mo.  States had mech trip and fall approx 1 mo ago, fell up 1 step and thinks started after that.  Denies head injury at time.  Was never evaluated after fall.  States over past few days noticed some lightheadedness, cough, and occ mild sob.  Denies f/c, ha, cp, palpitations, abd pain, back pain, numbness/tingling/weakness to ext.  Denies smoking, recent travel, exogenous hormone use, or hx dvt/pe.  VS notable for /93  Exam with mild nonpitting edema b/l lower ext and diffuse generalized discomfort, no focal bony ttp.  Nl cardiopulm exam.  No ttp to back  Will check ekg, labs, bnp, cxr, us b/l lower ext to r/o dvt, electrolyte ab, arrythmia, chf.  could be dependent edema.  doubt acute fx, as pt with FROM, no focal bony ttp and has been ambulatory since fall  --  labs grossly unrevealing  wet read cxr without obv abnormality  dopplers neg  ekg without acute ischemia  d/w pt results, advised close f/u with pmd  supportive care, strict return precautions provided, will dc

## 2024-04-28 NOTE — ED ADULT NURSE NOTE - NSFALLRISKINTERV_ED_ALL_ED
Assistance OOB with selected safe patient handling equipment if applicable/Assistance with ambulation/Communicate fall risk and risk factors to all staff, patient, and family/Monitor gait and stability/Provide visual cue: yellow wristband, yellow gown, etc/Reinforce activity limits and safety measures with patient and family/Call bell, personal items and telephone in reach/Instruct patient to call for assistance before getting out of bed/chair/stretcher/Non-slip footwear applied when patient is off stretcher/[place] to call system/Physically safe environment - no spills, clutter or unnecessary equipment/Purposeful Proactive Rounding/Room/bathroom lighting operational, light cord in reach

## 2024-04-28 NOTE — ED PROVIDER NOTE - PHYSICAL EXAMINATION
CONSTITUTIONAL: Awake, alert.  Nontoxic, no acute distress.    HEAD: Normocephalic, atraumatic.    EYES: Conjunctivae clear without exudates or hemorrhage. Sclera is non-icteric.    ENT: Normal appearing external ears, nose, mucous membranes moist.    NECK: supple, trachea midline.    HEART:  Normal rate, regular rhythm.  Heart sounds S1, S2.  No murmurs, rubs or gallops.    LUNGS:  No acute respiratory distress.  Non-tachypneic and non-labored.  Lungs are clear bilaterally with good aeration.  No wheezing, rales, rhonchi.    BACK: No midline or paraspinal ttp.    MUSCULOSKELETAL:  Normal appearing extremities without obvious deformity, rash, ecchymosis, erythema.  Mild nonpitting edema b/l lower ext with generalized diffuse discomfort throughout lower ext.  No focal bony tenderness.  FROM b/l lower extremities.  5/5 strength b/l lower ext.  Sensation and motor function grossly intact.  Strong equal peripheral pulses b/l.   Cap refill < 2 b/l upper and lower ext.  All compartments soft.    SKIN: Skin in warm, dry and intact without rashes or lesions.  Appropriate color for ethnicity.    NEUROLOGICAL:  Patient is alert, oriented x person, place and time.    PSYCH: Appropriate mood and affect. Good judgment and insight.

## 2024-04-28 NOTE — ED ADULT NURSE NOTE - OBJECTIVE STATEMENT
Patient to ED c/o BLE pain s/p mechanical trip and fall x 1 month ago. No obvious signs of trauma/injury, ambulatory with steady gait, AAOX4, NAD.

## 2024-04-28 NOTE — ED ADULT TRIAGE NOTE - CHIEF COMPLAINT QUOTE
Pt co b/l leg pain s/p mechanical trip and fall 1 month ago. Pain worse in L leg and R calf. Ambulatory with steady gait.

## 2024-04-28 NOTE — ED PROVIDER NOTE - OBJECTIVE STATEMENT
54 y/o female w/ hx htn p/w diffuse b/l leg pain and swelling x approx 1 mo.  States had mech trip and fall approx 1 mo ago, fell up 1 step and thinks started after that.  States over past few days noticed some lightheadedness, cough, and occ mild sob.  Denies f/c, cp, palpitations, abd pain, back pain, numbness/tingling/weakness to ext.  Denies smoking, recent travel, exogenous hormone use, or hx dvt/pe. 52 y/o female w/ hx htn p/w diffuse b/l leg pain and swelling x approx 1 mo.  States had mech trip and fall approx 1 mo ago, fell up 1 step and thinks started after that.  Denies head injury at time.  Was never evaluated after fall.  States over past few days noticed some lightheadedness, cough, and occ mild sob.  Denies f/c, ha, cp, palpitations, abd pain, back pain, numbness/tingling/weakness to ext.  Denies smoking, recent travel, exogenous hormone use, or hx dvt/pe.

## 2024-04-28 NOTE — ED PROVIDER NOTE - PATIENT PORTAL LINK FT
Pre op assessment complete. Pt needs: anesthesia consent.    You can access the FollowMyHealth Patient Portal offered by [facility] by registering at the following website: [email or web address]. By joining [employer]’s FollowMyHealth portal, you will also be able to view your health information using other applications (apps) compatible with our system.

## 2024-04-28 NOTE — ED PROVIDER NOTE - NS ED ROS FT
CONSTITUTIONAL: Denies fever and chills    HEENT: +cough     RESPIRATORY: See HPI    CARDIOVASCULAR: Denies palpitations and chest pain.    GASTROINTESTINAL: Denies abdominal pain, nausea, vomiting and diarrhea.    MUSCULOSKELETAL: See HPI

## 2024-04-28 NOTE — ED PROVIDER NOTE - NSFOLLOWUPINSTRUCTIONS_ED_ALL_ED_FT
Thank you for visiting [facility] Emergency Department.      We saw you today for leg pain.     PAIN CONTROL:   You may take ibuprofen (Motrin, Advil) 600 mg (3 regular tablets) every 6 hours as needed for pain.  Please take with food.  Stop taking if you develop abdominal pain, dark/ bloody stools.  Do not mix with other NSAIDS (ie. Naproxen, Aleve, Celecoxib).  You may also take acetaminophen (Tylenol) 650-975mg (2-3 regular tablets) or 500-1000mg (1-2 extra strength tablets) every 6 hours as needed for pain.  Do not exceed 4000 mg in 1 day. These medications may be bought over the counter.    I recommend alternating the Ibuprofen and Tylenol so you are getting medications around the clock.  For example take the Ibuprofen, then 3 hours later take the Tylenol, then 3 hours later take the Ibuprofen, and repeat as needed.    Rest. Apply ice to affected area 20 minutes on, then 20 minutes off.  You may repeat throughout the day.  Please wear ACE wrap as instructed. Elevate affected extremity.  You may want to try compression stockings.    Please know that no emergency visit is complete without follow-up with your primary care provider in 1 week.  Please bring copies of all discharge papers and results and show to your doctor.      Please continue taking all previous medications as instructed unless we discussed otherwise.     I appreciated your patience and hope you feel better soon.     Return to ER immediately if you develop fevers, chills, chest pain, shortness of breath, worsening and/or any concerning symptoms.
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
## 2024-04-30 DIAGNOSIS — M79.604 PAIN IN RIGHT LEG: ICD-10-CM

## 2024-04-30 DIAGNOSIS — Z88.0 ALLERGY STATUS TO PENICILLIN: ICD-10-CM

## 2024-04-30 DIAGNOSIS — Z20.822 CONTACT WITH AND (SUSPECTED) EXPOSURE TO COVID-19: ICD-10-CM

## 2024-04-30 DIAGNOSIS — R05.9 COUGH, UNSPECIFIED: ICD-10-CM

## 2024-04-30 DIAGNOSIS — Z88.8 ALLERGY STATUS TO OTHER DRUGS, MEDICAMENTS AND BIOLOGICAL SUBSTANCES: ICD-10-CM
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
## 2024-04-30 DIAGNOSIS — M79.605 PAIN IN LEFT LEG: ICD-10-CM

## 2024-05-20 ENCOUNTER — APPOINTMENT (OUTPATIENT)
Dept: PAIN MANAGEMENT | Facility: CLINIC | Age: 54
End: 2024-05-20
Payer: COMMERCIAL

## 2024-05-20 VITALS — WEIGHT: 277 LBS | BODY MASS INDEX: 44.52 KG/M2 | HEIGHT: 66 IN

## 2024-05-20 DIAGNOSIS — Z86.018 PERSONAL HISTORY OF OTHER BENIGN NEOPLASM: ICD-10-CM

## 2024-05-20 DIAGNOSIS — Z83.3 FAMILY HISTORY OF DIABETES MELLITUS: ICD-10-CM

## 2024-05-20 DIAGNOSIS — G89.29 PAIN IN RIGHT KNEE: ICD-10-CM

## 2024-05-20 DIAGNOSIS — M25.562 PAIN IN RIGHT KNEE: ICD-10-CM

## 2024-05-20 DIAGNOSIS — Z78.9 OTHER SPECIFIED HEALTH STATUS: ICD-10-CM

## 2024-05-20 DIAGNOSIS — M25.561 PAIN IN RIGHT KNEE: ICD-10-CM

## 2024-05-20 PROCEDURE — 73562 X-RAY EXAM OF KNEE 3: CPT | Mod: 50

## 2024-05-20 PROCEDURE — 99204 OFFICE O/P NEW MOD 45 MIN: CPT

## 2024-05-20 RX ORDER — DICLOFENAC POTASSIUM 50 MG/1
50 TABLET, COATED ORAL 3 TIMES DAILY
Qty: 60 | Refills: 1 | Status: ACTIVE | COMMUNITY
Start: 2024-05-20 | End: 1900-01-01

## 2024-05-20 NOTE — HISTORY OF PRESENT ILLNESS
[Left Leg] : left leg [Right Arm] : right arm [8] : 8 [Burning] : burning [Dull/Aching] : dull/aching [Radiating] : radiating [Sharp] : sharp [Shooting] : shooting [Stabbing] : stabbing [Throbbing] : throbbing [Tightness] : tightness [Constant] : constant [Household chores] : household chores [Leisure] : leisure [Sleep] : sleep [Nothing helps with pain getting better] : Nothing helps with pain getting better [Sitting] : sitting [Standing] : standing [Walking] : walking [] : no [FreeTextEntry1] : b/l knee pain  [FreeTextEntry7] : b/l legs

## 2024-05-20 NOTE — PHYSICAL EXAM
[de-identified] : Gen: NAD Head: NC/AT Eyes: wears glasses, no scleral icterus ENT: mucous membranes moist CV: No JVD Lungs: nonlabored breathing Abd: soft, NT/ND Ext: +1 edema over both knees, full extension and limited flexion bilaterally Neuro: CN intact LEs +5 L +5 R hip flexion +5 L +5 R leg extension +5 L +5 R leg flexion +5 L +5 R foot dorsiflexion +5 L +5 R foot plantarflexion +5 L +5 R EHL extension Psych: normal affect Skin: no visible lesions

## 2024-07-15 ENCOUNTER — APPOINTMENT (OUTPATIENT)
Dept: PAIN MANAGEMENT | Facility: CLINIC | Age: 54
End: 2024-07-15
Payer: COMMERCIAL

## 2024-07-15 VITALS — HEIGHT: 66 IN | WEIGHT: 280 LBS | BODY MASS INDEX: 45 KG/M2

## 2024-07-15 DIAGNOSIS — G89.29 PAIN IN RIGHT KNEE: ICD-10-CM

## 2024-07-15 DIAGNOSIS — M25.562 PAIN IN RIGHT KNEE: ICD-10-CM

## 2024-07-15 DIAGNOSIS — M25.561 PAIN IN RIGHT KNEE: ICD-10-CM

## 2024-07-15 PROCEDURE — 99214 OFFICE O/P EST MOD 30 MIN: CPT

## 2024-10-24 ENCOUNTER — APPOINTMENT (OUTPATIENT)
Dept: PAIN MANAGEMENT | Facility: CLINIC | Age: 54
End: 2024-10-24
Payer: COMMERCIAL

## 2024-10-24 VITALS — BODY MASS INDEX: 44.84 KG/M2 | HEIGHT: 66 IN | WEIGHT: 279 LBS

## 2024-10-24 DIAGNOSIS — M25.561 PAIN IN RIGHT KNEE: ICD-10-CM

## 2024-10-24 DIAGNOSIS — M25.562 PAIN IN RIGHT KNEE: ICD-10-CM

## 2024-10-24 DIAGNOSIS — G89.29 PAIN IN RIGHT KNEE: ICD-10-CM

## 2024-10-24 PROCEDURE — 99214 OFFICE O/P EST MOD 30 MIN: CPT

## 2024-11-18 ENCOUNTER — APPOINTMENT (OUTPATIENT)
Dept: ORTHOPEDIC SURGERY | Facility: CLINIC | Age: 54
End: 2024-11-18

## 2025-01-06 NOTE — ED ADULT NURSE NOTE - NS ED NURSE RECORD ANOTHER VITAL SIGN
"Pt is still coughing. Please see pt message below and advise. Pt sent it through pt schedule message.     "I talked to Jessica over the weekend and she ordered a chest xray which I did Saturday and it was clear. So I dont think there is anything else for me to do. Can you ask her if she thinks I should go in this week?  I told her I still had cough medicine and I would start taking it regularly. I will probably call the pharmacy for a refill. Let me know what you think."  "
Chest x ray was clear- I have sent her tessalon and albuterol inh to use during the day- cough syrup refilled for use after work and during evening as it will make her sleepy   
Patient portal message sent   
Yes

## 2025-02-04 NOTE — ED ADULT NURSE NOTE - DRUG PRE-SCREENING (DAST -1)
Lulu Graves   [id number]   1958   female    Patient has a history of sleep apnea and uses auto CPAP 8 to 12 cm but call the sleep center saying that she is not able to use the CPAP because of choking sensation and cough and she had a open heart surgery last month.    I have reviewed her download and I am going to change his CPAP pressure to 6 cm to 10 cm.  The starting pressure on the CPAP will be 4 cm with a ramp time of 30 minutes so that patient can get used to.      Angelique Wylie MD  2/4/2025    
Statement Selected

## 2025-03-03 ENCOUNTER — APPOINTMENT (OUTPATIENT)
Dept: PAIN MANAGEMENT | Facility: CLINIC | Age: 55
End: 2025-03-03
Payer: COMMERCIAL

## 2025-03-04 ENCOUNTER — APPOINTMENT (OUTPATIENT)
Dept: PAIN MANAGEMENT | Facility: CLINIC | Age: 55
End: 2025-03-04
Payer: COMMERCIAL

## 2025-03-04 VITALS — HEIGHT: 66 IN | BODY MASS INDEX: 41.95 KG/M2 | WEIGHT: 261 LBS

## 2025-03-04 DIAGNOSIS — G89.29 PAIN IN RIGHT KNEE: ICD-10-CM

## 2025-03-04 DIAGNOSIS — M25.561 PAIN IN RIGHT KNEE: ICD-10-CM

## 2025-03-04 DIAGNOSIS — E66.01 MORBID (SEVERE) OBESITY DUE TO EXCESS CALORIES: ICD-10-CM

## 2025-03-04 DIAGNOSIS — M25.562 PAIN IN RIGHT KNEE: ICD-10-CM

## 2025-03-04 PROCEDURE — 99214 OFFICE O/P EST MOD 30 MIN: CPT
